# Patient Record
Sex: MALE | Race: WHITE | Employment: OTHER | URBAN - METROPOLITAN AREA
[De-identification: names, ages, dates, MRNs, and addresses within clinical notes are randomized per-mention and may not be internally consistent; named-entity substitution may affect disease eponyms.]

---

## 2017-01-11 ENCOUNTER — TELEPHONE (OUTPATIENT)
Dept: SURGERY | Age: 67
End: 2017-01-11

## 2017-06-29 ENCOUNTER — HOSPITAL ENCOUNTER (OUTPATIENT)
Dept: GENERAL RADIOLOGY | Age: 67
Discharge: HOME | End: 2017-06-29
Attending: INTERNAL MEDICINE | Admitting: INTERNAL MEDICINE

## 2017-06-29 LAB
ANION GAP SERPL CALCULATED.3IONS-SCNC: 14 MMOL/L (ref 4–16)
APTT: 29.3 SECONDS (ref 21.2–33)
BASOPHILS ABSOLUTE: 0 K/CU MM
BASOPHILS RELATIVE PERCENT: 0.5 % (ref 0–1)
BUN BLDV-MCNC: 8 MG/DL (ref 6–23)
CALCIUM SERPL-MCNC: 9.7 MG/DL (ref 8.3–10.6)
CHLORIDE BLD-SCNC: 101 MMOL/L (ref 99–110)
CO2: 25 MMOL/L (ref 21–32)
CREAT SERPL-MCNC: 0.8 MG/DL (ref 0.9–1.3)
DIFFERENTIAL TYPE: ABNORMAL
EOSINOPHILS ABSOLUTE: 0.2 K/CU MM
EOSINOPHILS RELATIVE PERCENT: 1.9 % (ref 0–3)
GFR AFRICAN AMERICAN: >60 ML/MIN/1.73M2
GFR NON-AFRICAN AMERICAN: >60 ML/MIN/1.73M2
GLUCOSE BLD-MCNC: 72 MG/DL (ref 70–140)
HCT VFR BLD CALC: 39 % (ref 42–52)
HEMOGLOBIN: 13.4 GM/DL (ref 13.5–18)
IMMATURE NEUTROPHIL %: 0.4 % (ref 0–0.43)
INR BLD: 0.95 INDEX
LYMPHOCYTES ABSOLUTE: 2.4 K/CU MM
LYMPHOCYTES RELATIVE PERCENT: 29.8 % (ref 24–44)
MCH RBC QN AUTO: 32.1 PG (ref 27–31)
MCHC RBC AUTO-ENTMCNC: 34.4 % (ref 32–36)
MCV RBC AUTO: 93.5 FL (ref 78–100)
MONOCYTES ABSOLUTE: 0.9 K/CU MM
MONOCYTES RELATIVE PERCENT: 10.8 % (ref 0–4)
NUCLEATED RBC %: 0 %
PDW BLD-RTO: 15.3 % (ref 11.7–14.9)
PLATELET # BLD: 227 K/CU MM (ref 140–440)
PMV BLD AUTO: 10.7 FL (ref 7.5–11.1)
POTASSIUM SERPL-SCNC: 4.1 MMOL/L (ref 3.5–5.1)
PROTHROMBIN TIME: 10.8 SECONDS (ref 9.12–12.5)
RBC # BLD: 4.17 M/CU MM (ref 4.6–6.2)
SEGMENTED NEUTROPHILS ABSOLUTE COUNT: 4.5 K/CU MM
SEGMENTED NEUTROPHILS RELATIVE PERCENT: 56.6 % (ref 36–66)
SODIUM BLD-SCNC: 140 MMOL/L (ref 135–145)
TOTAL IMMATURE NEUTOROPHIL: 0.03 K/CU MM
TOTAL NUCLEATED RBC: 0 K/CU MM
WBC # BLD: 7.9 K/CU MM (ref 4–10.5)

## 2019-10-30 ENCOUNTER — APPOINTMENT (OUTPATIENT)
Dept: CT IMAGING | Age: 69
End: 2019-10-30
Payer: MEDICARE

## 2019-10-30 ENCOUNTER — APPOINTMENT (OUTPATIENT)
Dept: GENERAL RADIOLOGY | Age: 69
End: 2019-10-30
Payer: MEDICARE

## 2019-10-30 ENCOUNTER — HOSPITAL ENCOUNTER (EMERGENCY)
Age: 69
Discharge: LEFT BEFORE DISCHARGE | End: 2019-10-30
Attending: EMERGENCY MEDICINE
Payer: MEDICARE

## 2019-10-30 VITALS
SYSTOLIC BLOOD PRESSURE: 135 MMHG | OXYGEN SATURATION: 92 % | HEART RATE: 93 BPM | HEIGHT: 66 IN | BODY MASS INDEX: 24.91 KG/M2 | RESPIRATION RATE: 15 BRPM | TEMPERATURE: 97.8 F | WEIGHT: 155 LBS | DIASTOLIC BLOOD PRESSURE: 78 MMHG

## 2019-10-30 DIAGNOSIS — R77.8 ELEVATED TROPONIN: ICD-10-CM

## 2019-10-30 DIAGNOSIS — R09.02 HYPOXIA: ICD-10-CM

## 2019-10-30 DIAGNOSIS — R55 SYNCOPE AND COLLAPSE: Primary | ICD-10-CM

## 2019-10-30 DIAGNOSIS — R78.0 ELEVATED BLOOD ALCOHOL LEVEL, BLOOD ALCOHOL LEVEL NOT SPECIFIED: ICD-10-CM

## 2019-10-30 LAB
ALBUMIN SERPL-MCNC: 4.2 GM/DL (ref 3.4–5)
ALCOHOL SCREEN SERUM: 0.25 %WT/VOL
ALP BLD-CCNC: 97 IU/L (ref 40–128)
ALT SERPL-CCNC: 36 U/L (ref 10–40)
ANION GAP SERPL CALCULATED.3IONS-SCNC: 19 MMOL/L (ref 4–16)
AST SERPL-CCNC: 43 IU/L (ref 15–37)
BASOPHILS ABSOLUTE: 0.1 K/CU MM
BASOPHILS RELATIVE PERCENT: 0.7 % (ref 0–1)
BILIRUB SERPL-MCNC: 0.3 MG/DL (ref 0–1)
BUN BLDV-MCNC: 5 MG/DL (ref 6–23)
CALCIUM SERPL-MCNC: 8.6 MG/DL (ref 8.3–10.6)
CHLORIDE BLD-SCNC: 94 MMOL/L (ref 99–110)
CO2: 18 MMOL/L (ref 21–32)
CREAT SERPL-MCNC: 0.6 MG/DL (ref 0.9–1.3)
DIFFERENTIAL TYPE: ABNORMAL
EOSINOPHILS ABSOLUTE: 0.1 K/CU MM
EOSINOPHILS RELATIVE PERCENT: 0.8 % (ref 0–3)
GFR AFRICAN AMERICAN: >60 ML/MIN/1.73M2
GFR NON-AFRICAN AMERICAN: >60 ML/MIN/1.73M2
GLUCOSE BLD-MCNC: 94 MG/DL (ref 70–99)
HCT VFR BLD CALC: 44.7 % (ref 42–52)
HEMOGLOBIN: 15.1 GM/DL (ref 13.5–18)
IMMATURE NEUTROPHIL %: 0.7 % (ref 0–0.43)
LYMPHOCYTES ABSOLUTE: 1.3 K/CU MM
LYMPHOCYTES RELATIVE PERCENT: 14.1 % (ref 24–44)
MCH RBC QN AUTO: 34.4 PG (ref 27–31)
MCHC RBC AUTO-ENTMCNC: 33.8 % (ref 32–36)
MCV RBC AUTO: 101.8 FL (ref 78–100)
MONOCYTES ABSOLUTE: 0.8 K/CU MM
MONOCYTES RELATIVE PERCENT: 8.8 % (ref 0–4)
NUCLEATED RBC %: 0 %
PDW BLD-RTO: 14 % (ref 11.7–14.9)
PLATELET # BLD: 300 K/CU MM (ref 140–440)
PMV BLD AUTO: 10 FL (ref 7.5–11.1)
POTASSIUM SERPL-SCNC: 3.6 MMOL/L (ref 3.5–5.1)
RBC # BLD: 4.39 M/CU MM (ref 4.6–6.2)
SEGMENTED NEUTROPHILS ABSOLUTE COUNT: 6.7 K/CU MM
SEGMENTED NEUTROPHILS RELATIVE PERCENT: 74.9 % (ref 36–66)
SODIUM BLD-SCNC: 131 MMOL/L (ref 135–145)
TOTAL CK: 141 IU/L (ref 38–174)
TOTAL IMMATURE NEUTOROPHIL: 0.06 K/CU MM
TOTAL NUCLEATED RBC: 0 K/CU MM
TOTAL PROTEIN: 7 GM/DL (ref 6.4–8.2)
TROPONIN T: 0.02 NG/ML
WBC # BLD: 8.9 K/CU MM (ref 4–10.5)

## 2019-10-30 PROCEDURE — 82550 ASSAY OF CK (CPK): CPT

## 2019-10-30 PROCEDURE — 80053 COMPREHEN METABOLIC PANEL: CPT

## 2019-10-30 PROCEDURE — 93005 ELECTROCARDIOGRAM TRACING: CPT | Performed by: EMERGENCY MEDICINE

## 2019-10-30 PROCEDURE — 85025 COMPLETE CBC W/AUTO DIFF WBC: CPT

## 2019-10-30 PROCEDURE — 71045 X-RAY EXAM CHEST 1 VIEW: CPT

## 2019-10-30 PROCEDURE — 99284 EMERGENCY DEPT VISIT MOD MDM: CPT

## 2019-10-30 PROCEDURE — 36415 COLL VENOUS BLD VENIPUNCTURE: CPT

## 2019-10-30 PROCEDURE — 72125 CT NECK SPINE W/O DYE: CPT

## 2019-10-30 PROCEDURE — 84484 ASSAY OF TROPONIN QUANT: CPT

## 2019-10-30 PROCEDURE — G0480 DRUG TEST DEF 1-7 CLASSES: HCPCS

## 2019-10-30 PROCEDURE — 70450 CT HEAD/BRAIN W/O DYE: CPT

## 2019-10-30 ASSESSMENT — ENCOUNTER SYMPTOMS
NAUSEA: 0
EYE REDNESS: 0
DIARRHEA: 0
CONSTIPATION: 0
BACK PAIN: 0
SORE THROAT: 0
COUGH: 0
SHORTNESS OF BREATH: 0
RHINORRHEA: 0
VOMITING: 0
ABDOMINAL PAIN: 0

## 2019-10-31 LAB
EKG ATRIAL RATE: 76 BPM
EKG ATRIAL RATE: 86 BPM
EKG DIAGNOSIS: NORMAL
EKG DIAGNOSIS: NORMAL
EKG P AXIS: 36 DEGREES
EKG P AXIS: 51 DEGREES
EKG P-R INTERVAL: 174 MS
EKG P-R INTERVAL: 194 MS
EKG Q-T INTERVAL: 398 MS
EKG Q-T INTERVAL: 408 MS
EKG QRS DURATION: 110 MS
EKG QRS DURATION: 134 MS
EKG QTC CALCULATION (BAZETT): 459 MS
EKG QTC CALCULATION (BAZETT): 476 MS
EKG R AXIS: -49 DEGREES
EKG R AXIS: 2 DEGREES
EKG T AXIS: 37 DEGREES
EKG T AXIS: 41 DEGREES
EKG VENTRICULAR RATE: 76 BPM
EKG VENTRICULAR RATE: 86 BPM

## 2019-10-31 PROCEDURE — 93010 ELECTROCARDIOGRAM REPORT: CPT | Performed by: INTERNAL MEDICINE

## 2022-01-11 ENCOUNTER — HOSPITAL ENCOUNTER (OUTPATIENT)
Age: 72
Discharge: HOME | End: 2022-01-11
Payer: MEDICARE

## 2022-01-11 LAB
ANION GAP SERPL CALCULATED.3IONS-SCNC: 13 MMOL/L (ref 4–16)
APTT: 26.9 SECONDS (ref 25.1–37.1)
BASOPHILS ABSOLUTE: 0.1 K/CU MM
BASOPHILS RELATIVE PERCENT: 0.4 % (ref 0–1)
BUN BLDV-MCNC: 5 MG/DL (ref 6–23)
CALCIUM SERPL-MCNC: 9.9 MG/DL (ref 8.3–10.6)
CHLORIDE BLD-SCNC: 101 MMOL/L (ref 99–110)
CO2: 24 MMOL/L (ref 21–32)
CREAT SERPL-MCNC: 0.6 MG/DL (ref 0.9–1.3)
DIFFERENTIAL TYPE: ABNORMAL
EOSINOPHILS ABSOLUTE: 0 K/CU MM
EOSINOPHILS RELATIVE PERCENT: 0.3 % (ref 0–3)
GFR AFRICAN AMERICAN: >60 ML/MIN/1.73M2
GFR NON-AFRICAN AMERICAN: >60 ML/MIN/1.73M2
GLUCOSE BLD-MCNC: 124 MG/DL (ref 70–99)
HCT VFR BLD CALC: 50.8 % (ref 42–52)
HEMOGLOBIN: 17.2 GM/DL (ref 13.5–18)
IMMATURE NEUTROPHIL %: 0.9 % (ref 0–0.43)
INR BLD: 0.86 INDEX
LYMPHOCYTES ABSOLUTE: 1.3 K/CU MM
LYMPHOCYTES RELATIVE PERCENT: 10.9 % (ref 24–44)
MCH RBC QN AUTO: 34.6 PG (ref 27–31)
MCHC RBC AUTO-ENTMCNC: 33.9 % (ref 32–36)
MCV RBC AUTO: 102.2 FL (ref 78–100)
MONOCYTES ABSOLUTE: 0.7 K/CU MM
MONOCYTES RELATIVE PERCENT: 6.2 % (ref 0–4)
NUCLEATED RBC %: 0 %
PDW BLD-RTO: 13.4 % (ref 11.7–14.9)
PLATELET # BLD: 192 K/CU MM (ref 140–440)
PMV BLD AUTO: 11.5 FL (ref 7.5–11.1)
POTASSIUM SERPL-SCNC: 4.6 MMOL/L (ref 3.5–5.1)
PROTHROMBIN TIME: 11.1 SECONDS (ref 11.7–14.5)
RBC # BLD: 4.97 M/CU MM (ref 4.6–6.2)
SARS-COV-2: NOT DETECTED
SEGMENTED NEUTROPHILS ABSOLUTE COUNT: 9.5 K/CU MM
SEGMENTED NEUTROPHILS RELATIVE PERCENT: 81.3 % (ref 36–66)
SODIUM BLD-SCNC: 138 MMOL/L (ref 135–145)
SOURCE: NORMAL
TOTAL IMMATURE NEUTOROPHIL: 0.1 K/CU MM
TOTAL NUCLEATED RBC: 0 K/CU MM
WBC # BLD: 11.6 K/CU MM (ref 4–10.5)

## 2022-01-11 PROCEDURE — U0005 INFEC AGEN DETEC AMPLI PROBE: HCPCS

## 2022-01-11 PROCEDURE — 85025 COMPLETE CBC W/AUTO DIFF WBC: CPT

## 2022-01-11 PROCEDURE — 36415 COLL VENOUS BLD VENIPUNCTURE: CPT

## 2022-01-11 PROCEDURE — 80048 BASIC METABOLIC PNL TOTAL CA: CPT

## 2022-01-11 PROCEDURE — 85730 THROMBOPLASTIN TIME PARTIAL: CPT

## 2022-01-11 PROCEDURE — 85610 PROTHROMBIN TIME: CPT

## 2022-01-11 PROCEDURE — U0003 INFECTIOUS AGENT DETECTION BY NUCLEIC ACID (DNA OR RNA); SEVERE ACUTE RESPIRATORY SYNDROME CORONAVIRUS 2 (SARS-COV-2) (CORONAVIRUS DISEASE [COVID-19]), AMPLIFIED PROBE TECHNIQUE, MAKING USE OF HIGH THROUGHPUT TECHNOLOGIES AS DESCRIBED BY CMS-2020-01-R: HCPCS

## 2022-01-17 ENCOUNTER — HOSPITAL ENCOUNTER (OUTPATIENT)
Dept: CARDIAC CATH/INVASIVE PROCEDURES | Age: 72
Discharge: HOME | End: 2022-01-17
Attending: INTERNAL MEDICINE | Admitting: INTERNAL MEDICINE
Payer: MEDICARE

## 2022-01-17 VITALS
BODY MASS INDEX: 26.84 KG/M2 | HEIGHT: 66 IN | HEART RATE: 85 BPM | TEMPERATURE: 97.3 F | RESPIRATION RATE: 18 BRPM | SYSTOLIC BLOOD PRESSURE: 120 MMHG | DIASTOLIC BLOOD PRESSURE: 80 MMHG | OXYGEN SATURATION: 94 % | WEIGHT: 167 LBS

## 2022-01-17 LAB — ACTIVATED CLOTTING TIME, LOW RANGE: 395 SEC

## 2022-01-17 PROCEDURE — 6360000002 HC RX W HCPCS

## 2022-01-17 PROCEDURE — C9600 PERC DRUG-EL COR STENT SING: HCPCS

## 2022-01-17 PROCEDURE — 93458 L HRT ARTERY/VENTRICLE ANGIO: CPT

## 2022-01-17 PROCEDURE — 6370000000 HC RX 637 (ALT 250 FOR IP): Performed by: INTERNAL MEDICINE

## 2022-01-17 PROCEDURE — C1769 GUIDE WIRE: HCPCS

## 2022-01-17 PROCEDURE — C1874 STENT, COATED/COV W/DEL SYS: HCPCS

## 2022-01-17 PROCEDURE — C1894 INTRO/SHEATH, NON-LASER: HCPCS

## 2022-01-17 PROCEDURE — 6360000004 HC RX CONTRAST MEDICATION

## 2022-01-17 PROCEDURE — C1887 CATHETER, GUIDING: HCPCS

## 2022-01-17 PROCEDURE — 6370000000 HC RX 637 (ALT 250 FOR IP)

## 2022-01-17 PROCEDURE — 2580000003 HC RX 258: Performed by: INTERNAL MEDICINE

## 2022-01-17 PROCEDURE — 2709999900 HC NON-CHARGEABLE SUPPLY

## 2022-01-17 PROCEDURE — 85347 COAGULATION TIME ACTIVATED: CPT

## 2022-01-17 RX ORDER — SODIUM CHLORIDE 9 MG/ML
INJECTION, SOLUTION INTRAVENOUS CONTINUOUS
Status: DISCONTINUED | OUTPATIENT
Start: 2022-01-17 | End: 2022-01-17 | Stop reason: HOSPADM

## 2022-01-17 RX ORDER — DIAZEPAM 5 MG/1
5 TABLET ORAL ONCE
Status: COMPLETED | OUTPATIENT
Start: 2022-01-17 | End: 2022-01-17

## 2022-01-17 RX ORDER — DIPHENHYDRAMINE HCL 25 MG
25 TABLET ORAL ONCE
Status: COMPLETED | OUTPATIENT
Start: 2022-01-17 | End: 2022-01-17

## 2022-01-17 RX ORDER — SODIUM CHLORIDE 9 MG/ML
25 INJECTION, SOLUTION INTRAVENOUS PRN
Status: DISCONTINUED | OUTPATIENT
Start: 2022-01-17 | End: 2022-01-17 | Stop reason: HOSPADM

## 2022-01-17 RX ORDER — ATROPINE SULFATE 0.4 MG/ML
0.5 AMPUL (ML) INJECTION
Status: DISCONTINUED | OUTPATIENT
Start: 2022-01-17 | End: 2022-01-17 | Stop reason: HOSPADM

## 2022-01-17 RX ORDER — CLOPIDOGREL BISULFATE 75 MG/1
75 TABLET ORAL DAILY
Qty: 90 TABLET | Refills: 1 | Status: ON HOLD | OUTPATIENT
Start: 2022-01-17 | End: 2022-02-23

## 2022-01-17 RX ORDER — SODIUM CHLORIDE 0.9 % (FLUSH) 0.9 %
5-40 SYRINGE (ML) INJECTION PRN
Status: DISCONTINUED | OUTPATIENT
Start: 2022-01-17 | End: 2022-01-17 | Stop reason: HOSPADM

## 2022-01-17 RX ORDER — SODIUM CHLORIDE 0.9 % (FLUSH) 0.9 %
5-40 SYRINGE (ML) INJECTION EVERY 12 HOURS SCHEDULED
Status: DISCONTINUED | OUTPATIENT
Start: 2022-01-17 | End: 2022-01-17 | Stop reason: HOSPADM

## 2022-01-17 RX ORDER — MORPHINE SULFATE 2 MG/ML
1 INJECTION, SOLUTION INTRAMUSCULAR; INTRAVENOUS
Status: DISCONTINUED | OUTPATIENT
Start: 2022-01-17 | End: 2022-01-17 | Stop reason: HOSPADM

## 2022-01-17 RX ORDER — ACETAMINOPHEN 325 MG/1
650 TABLET ORAL EVERY 4 HOURS PRN
Status: DISCONTINUED | OUTPATIENT
Start: 2022-01-17 | End: 2022-01-17 | Stop reason: HOSPADM

## 2022-01-17 RX ORDER — ASPIRIN 81 MG/1
81 TABLET ORAL DAILY
Qty: 90 TABLET | Refills: 1 | Status: ON HOLD | OUTPATIENT
Start: 2022-01-17 | End: 2022-02-23

## 2022-01-17 RX ADMIN — DIAZEPAM 5 MG: 5 TABLET ORAL at 08:45

## 2022-01-17 RX ADMIN — SODIUM CHLORIDE: 9 INJECTION, SOLUTION INTRAVENOUS at 08:45

## 2022-01-17 RX ADMIN — DIPHENHYDRAMINE HCL 25 MG: 25 TABLET ORAL at 08:45

## 2022-01-17 NOTE — PROGRESS NOTES
Received from cath lab. Monitor and alarms on. Call Light in reach. Bed in the lowest position. Procedure site assessment completed per Taco CUENCA and Sagar Curtis RN. No hematoma or bleeding noted.

## 2022-01-17 NOTE — PROGRESS NOTES
This RN called patient's son Magnolia Vincent for ride at discharge. Son verbalized understanding for pickup at [phone] at main entrance.

## 2022-01-17 NOTE — H&P
[address] [place] [address], [address] [place] [address]                              HISTORY AND PHYSICAL    PATIENT NAME: Junior Zhang                   :        1950  MED REC NO:   [id number]                          ROOM:  ACCOUNT NO:   [de-identified]                           ADMIT DATE: 2022  PROVIDER:     Misbah Sotomayor MD    INDICATION:  Abnormal stress test.    HISTORY OF PRESENT ILLNESS:  This is a 74-year-old male patient, patient  of Dr. Khanh Bloom, who underwent a stress test and the stress test was found  to have abnormal and his stress test showed that he had TID present and  EF was 42%. Large inferoapical MI noted, mild to moderate ischemia  noted. The patient was also sinus tachycardia present. Therefore, the  patient is here for heart catheterization. _____ greater than 80% right  SFA stenosis. Right profunda femoral artery has moderate disease also  present. He has history of peripheral vascular disease status post  angioplasty done also. PAST MEDICAL HISTORY:  Peripheral vascular disease, history of  hypertension, hyperlipidemia, depression present. Peripheral vascular  disease status post peripheral intervention done. He has sleep apnea,  COPD, anxiety, spinal stenosis and tremor present. PAST SURGERIES:  Hernia repair, neck surgery done with _____ effusion  and peripheral intervention. SOCIAL HISTORY:  Does not smoke, does not drink. MEDICATIONS:  He is on aspirin, buspirone, Pletal, Coreg, folic acid,  Lexapro, naltrexone, Norvasc, simvastatin, Singulair. PHYSICAL EXAMINATION:  GENERAL:  The patient is awake, alert, and answering questions, not in  acute distress. VITAL SIGNS:  Temperature is afebrile, pulse of 100, blood pressure  125/80. HEENT:  Head is normocephalic and atraumatic. Pupils are equal and  reactive to light. CHEST:  Equal expansion. LUNGS:  Clear to auscultation.   No wheezing or rhonchi. HEART:  Regular rate and rhythm. ABDOMEN:  Soft and nontender. Bowel sounds are present. No  hepatosplenomegaly or guarding appreciated. EXTREMITIES:  No cyanosis or clubbing noted. NEUROLOGIC:  Cranial nerves grossly intact. IMPRESSION:  This is a 68-year-old male patient, patient of Dr. Khanh Bloom,  who had a stress test done which was found to be abnormal.  Found to  have large ischemia present. He is also having peripheral vascular  disease present. His right leg has significant stenosis present. Believe I did his intervention in the right leg in the past.  So the  plan is for heart catheterization today and we will make further  recommendation based on that.         Liyah Deshpande MD    D: 01/17/2022 7:34:37       T: 01/17/2022 7:37:00     [email or web address]  Job#: [id number]     Doc#: [id number]    CC:

## 2022-01-17 NOTE — PROGRESS NOTES
Outpatient Pharmacy Progress Note for Meds-to-Beds    Total number of Prescriptions Filled: 1  The following medications were dispensed to the patient during the discharge process:   Clopidogrel 75mg    Additional Documentation:   Medication(s) were delivered to the patient's room prior to discharge    The prescription for Aspirin was not covered under the patient's insurance; this medication can be purchased as an over-the-counter medication. Thank you for letting us serve your patients.   [facility]    [address], [address] [facility]    Phone: [phone]    Fax: [phone]

## 2022-01-17 NOTE — FLOWSHEET NOTE
01/17/22 1125   Puncture Site Assessment 1   Location Radial - right   Site Assessment No redness, drainage, swelling or hematoma   Dressing Applied Transparent occlusive dressing   TR Band removed at this time. Right radial site free of bleeding/hematoma. Tegaderm applied to site. Arm board remains secured with kerlex as reminder to pt to minimize use of right arm.

## 2022-01-17 NOTE — PROGRESS NOTES
This RN called Dr. Huerta Figures office for Nurse office visit tomorrow 1/18/2022. Scheduled for 0930 tomorrow 1/18/2022. Patient to bring all home medications with him to appointment. Patient made aware and verbalized understanding.

## 2022-01-17 NOTE — FLOWSHEET NOTE
Plavix 75mg PO QD #30x0RF and ASA 81mg PO QD #30x0RF phoned to outpt pharmacy per Dr Yesenia Mijares while pt awaits mail in Rx.  Pharmacist states will bring prescriptions to cath holding prior to pt d/c home

## 2022-01-17 NOTE — PROCEDURES
[address] [place] [address], [address] [place] [address]                            CARDIAC CATHETERIZATION    PATIENT NAME: Zoë Chowdhury                   :        1950  MED REC NO:   [id number]                          ROOM:  ACCOUNT NO:   [de-identified]                           ADMIT DATE: 2022  PROVIDER:     Brook Baker MD    DATE OF PROCEDURE:  2022    INDICATION:  Abnormal stress test.    This is a 51-year-old male patient with a history of abnormal stress  test with peripheral vascular disease. The patient was brought to cath  lab today. Informed consent was obtained from the patient. The patient  was prepped and draped in usual sterile fashion. The patient was  injected with 5 mL of 2% lidocaine in the right radial region. Using a  radial needle, the right radial artery was cannulized, and a 5/6-Prydeinig  sheath was placed in the right radial artery. The entire procedure was  done using a guidewire, sheath was flushed in between the procedures. Next, using a TIG catheter, EDP was measured. EDP was around 10 mmHg  present. On the pullback, there was no gradient present across the  aortic valve. Using a TIG catheter, left coronary angiography was performed. Left  coronary angiogram revealed left main is patent. It trifurcates into  LAD, circ, and ramus branch. LAD has a mid 50% stenosis noted and it gives off the diagonal branch,  and it is diagonal 1 and diagonal 2, and mild disease present. Ramus is a medium-size vessel and has proximal 90% stenosis noted. Circ is a medium-sized vessel, and circ has mild disease present. It  gives off the OM1, OM2, mild disease noted. There is a collateral  circulation filling the right coronary artery. It fills the PD and the  PL branch and the distal right coronary artery. Using a TIG catheter, right coronary angiogram was performed.   Right  coronary angiogram revealed right coronary artery is proximally 100%  occluded. IMPRESSION:  1. Right coronary artery has proximal 90% stenosis noted with a GIUSEPPE-1  flow present with a left to right collateral circulation filling the  distal right coronary artery and the PD and PL branch. Left main is patent. Circ has mild disease noted. OM1 and OM2 mild disease noted. Ramus with 90% stenosis. LAD has a mid 50% stenosis noted. D1, D2 mild disease noted. EDP is  around 15 mmHg present. PLAN:  To proceed with intervention of the ramus branch. The patient already had a 6 Western Misa sheath. The patient is anticoagulated with heparin. ACT was kept greater than  250. The patient received Plavix 600 mg postprocedure and aspirin 325  mg postprocedure. Using a VL3 guide left main was engaged. Using BMW Elite wire lesion  was crossed into LAD and in the ramus branch. Lesion was stented with  drug-eluting stent Xience 2.5 x 33 mm stent. Stent was deployed on high  pressure. Lesion decreased from 90% to 0%. Final angiogram with GIUSEPPE-3  flow noted. No dissection, perforation or distal embolization noted. IMPRESSION:  Successful angioplasty of the ramus branch, lesion  decreased from 90% to 0% with a drug-eluting stent Xience 2.5 x 33 mm  stent. The patient tolerated the procedure well. No complications noted. IMPRESSION:  Successful angioplasty of the ramus branch. The patient has significant disease of RCA. If the patient remains  symptomatic, we will consider doing angioplasty of the right coronary  artery where at present the patient has a collateral circulation filling  the right coronary artery from the left system. The patient will be brought for intervention or angiogram of his  peripheral also, as he has abnormal right SFA JOHN noted. No  complication noted.       Blood loss 20cc  Cardiac rehab consulted    Lina Laws MD    D: 01/17/2022 9:44:28       T: 01/17/2022 9:47:28 [email or web address]  Job#: [id number]     Doc#: [id number]    CC:    (Retain this field even if not dictated or not decipherable)

## 2022-02-15 ENCOUNTER — HOSPITAL ENCOUNTER (OUTPATIENT)
Age: 72
Discharge: HOME | End: 2022-02-15
Payer: MEDICARE

## 2022-02-15 LAB
ANION GAP SERPL CALCULATED.3IONS-SCNC: 16 MMOL/L (ref 4–16)
APTT: 29.2 SECONDS (ref 25.1–37.1)
BASOPHILS ABSOLUTE: 0.1 K/CU MM
BASOPHILS RELATIVE PERCENT: 0.7 % (ref 0–1)
BUN BLDV-MCNC: 5 MG/DL (ref 6–23)
CALCIUM SERPL-MCNC: 9.6 MG/DL (ref 8.3–10.6)
CHLORIDE BLD-SCNC: 102 MMOL/L (ref 99–110)
CO2: 23 MMOL/L (ref 21–32)
CREAT SERPL-MCNC: 0.6 MG/DL (ref 0.9–1.3)
DIFFERENTIAL TYPE: ABNORMAL
EOSINOPHILS ABSOLUTE: 0.1 K/CU MM
EOSINOPHILS RELATIVE PERCENT: 1 % (ref 0–3)
GFR AFRICAN AMERICAN: >60 ML/MIN/1.73M2
GFR NON-AFRICAN AMERICAN: >60 ML/MIN/1.73M2
GLUCOSE BLD-MCNC: 112 MG/DL (ref 70–99)
HCT VFR BLD CALC: 49.1 % (ref 42–52)
HEMOGLOBIN: 17.2 GM/DL (ref 13.5–18)
IMMATURE NEUTROPHIL %: 0.7 % (ref 0–0.43)
INR BLD: 0.88 INDEX
LYMPHOCYTES ABSOLUTE: 1.1 K/CU MM
LYMPHOCYTES RELATIVE PERCENT: 11.9 % (ref 24–44)
MCH RBC QN AUTO: 35 PG (ref 27–31)
MCHC RBC AUTO-ENTMCNC: 35 % (ref 32–36)
MCV RBC AUTO: 100 FL (ref 78–100)
MONOCYTES ABSOLUTE: 0.8 K/CU MM
MONOCYTES RELATIVE PERCENT: 8.7 % (ref 0–4)
NUCLEATED RBC %: 0 %
PDW BLD-RTO: 13.3 % (ref 11.7–14.9)
PLATELET # BLD: 138 K/CU MM (ref 140–440)
PMV BLD AUTO: 11.5 FL (ref 7.5–11.1)
POTASSIUM SERPL-SCNC: 4.7 MMOL/L (ref 3.5–5.1)
PROTHROMBIN TIME: 11.3 SECONDS (ref 11.7–14.5)
RBC # BLD: 4.91 M/CU MM (ref 4.6–6.2)
SARS-COV-2: NOT DETECTED
SEGMENTED NEUTROPHILS ABSOLUTE COUNT: 6.8 K/CU MM
SEGMENTED NEUTROPHILS RELATIVE PERCENT: 77 % (ref 36–66)
SODIUM BLD-SCNC: 141 MMOL/L (ref 135–145)
SOURCE: NORMAL
TOTAL IMMATURE NEUTOROPHIL: 0.06 K/CU MM
TOTAL NUCLEATED RBC: 0 K/CU MM
WBC # BLD: 8.9 K/CU MM (ref 4–10.5)

## 2022-02-15 PROCEDURE — 85025 COMPLETE CBC W/AUTO DIFF WBC: CPT

## 2022-02-15 PROCEDURE — 80048 BASIC METABOLIC PNL TOTAL CA: CPT

## 2022-02-15 PROCEDURE — U0003 INFECTIOUS AGENT DETECTION BY NUCLEIC ACID (DNA OR RNA); SEVERE ACUTE RESPIRATORY SYNDROME CORONAVIRUS 2 (SARS-COV-2) (CORONAVIRUS DISEASE [COVID-19]), AMPLIFIED PROBE TECHNIQUE, MAKING USE OF HIGH THROUGHPUT TECHNOLOGIES AS DESCRIBED BY CMS-2020-01-R: HCPCS

## 2022-02-15 PROCEDURE — U0005 INFEC AGEN DETEC AMPLI PROBE: HCPCS

## 2022-02-15 PROCEDURE — 36415 COLL VENOUS BLD VENIPUNCTURE: CPT

## 2022-02-15 PROCEDURE — 85610 PROTHROMBIN TIME: CPT

## 2022-02-15 PROCEDURE — 85730 THROMBOPLASTIN TIME PARTIAL: CPT

## 2022-02-23 ENCOUNTER — HOSPITAL ENCOUNTER (OUTPATIENT)
Dept: CARDIAC CATH/INVASIVE PROCEDURES | Age: 72
Discharge: HOME | End: 2022-02-23
Attending: INTERNAL MEDICINE | Admitting: INTERNAL MEDICINE
Payer: MEDICARE

## 2022-02-23 VITALS
SYSTOLIC BLOOD PRESSURE: 149 MMHG | HEART RATE: 97 BPM | RESPIRATION RATE: 16 BRPM | BODY MASS INDEX: 26.68 KG/M2 | OXYGEN SATURATION: 94 % | WEIGHT: 166 LBS | DIASTOLIC BLOOD PRESSURE: 85 MMHG | TEMPERATURE: 97 F | HEIGHT: 66 IN

## 2022-02-23 DIAGNOSIS — I73.9 PAD (PERIPHERAL ARTERY DISEASE) (HCC): Primary | ICD-10-CM

## 2022-02-23 LAB
ACTIVATED CLOTTING TIME, LOW RANGE: 171 SEC
ACTIVATED CLOTTING TIME, LOW RANGE: 287 SEC
ACTIVATED CLOTTING TIME, LOW RANGE: 315 SEC

## 2022-02-23 PROCEDURE — 6370000000 HC RX 637 (ALT 250 FOR IP)

## 2022-02-23 PROCEDURE — 6360000002 HC RX W HCPCS

## 2022-02-23 PROCEDURE — 2580000003 HC RX 258: Performed by: INTERNAL MEDICINE

## 2022-02-23 PROCEDURE — C2623 CATH, TRANSLUMIN, DRUG-COAT: HCPCS

## 2022-02-23 PROCEDURE — C1887 CATHETER, GUIDING: HCPCS

## 2022-02-23 PROCEDURE — C1894 INTRO/SHEATH, NON-LASER: HCPCS

## 2022-02-23 PROCEDURE — C1876 STENT, NON-COA/NON-COV W/DEL: HCPCS

## 2022-02-23 PROCEDURE — 37221 HC ILIAC TERRITORY PLASTY STENT: CPT

## 2022-02-23 PROCEDURE — 6370000000 HC RX 637 (ALT 250 FOR IP): Performed by: INTERNAL MEDICINE

## 2022-02-23 PROCEDURE — 85347 COAGULATION TIME ACTIVATED: CPT

## 2022-02-23 PROCEDURE — 6360000004 HC RX CONTRAST MEDICATION

## 2022-02-23 PROCEDURE — 2709999900 HC NON-CHARGEABLE SUPPLY

## 2022-02-23 PROCEDURE — C1769 GUIDE WIRE: HCPCS

## 2022-02-23 PROCEDURE — 37224 HC FEM POP TERRITORY PLASTY: CPT

## 2022-02-23 PROCEDURE — C1725 CATH, TRANSLUMIN NON-LASER: HCPCS

## 2022-02-23 PROCEDURE — 2500000003 HC RX 250 WO HCPCS

## 2022-02-23 RX ORDER — SODIUM CHLORIDE 0.9 % (FLUSH) 0.9 %
5-40 SYRINGE (ML) INJECTION EVERY 12 HOURS SCHEDULED
Status: DISCONTINUED | OUTPATIENT
Start: 2022-02-23 | End: 2022-02-23 | Stop reason: HOSPADM

## 2022-02-23 RX ORDER — SODIUM CHLORIDE 9 MG/ML
25 INJECTION, SOLUTION INTRAVENOUS PRN
Status: DISCONTINUED | OUTPATIENT
Start: 2022-02-23 | End: 2022-02-23 | Stop reason: HOSPADM

## 2022-02-23 RX ORDER — DIAZEPAM 5 MG/1
5 TABLET ORAL ONCE
Status: COMPLETED | OUTPATIENT
Start: 2022-02-23 | End: 2022-02-23

## 2022-02-23 RX ORDER — MORPHINE SULFATE 2 MG/ML
1 INJECTION, SOLUTION INTRAMUSCULAR; INTRAVENOUS
Status: DISCONTINUED | OUTPATIENT
Start: 2022-02-23 | End: 2022-02-23 | Stop reason: HOSPADM

## 2022-02-23 RX ORDER — SODIUM CHLORIDE 9 MG/ML
INJECTION, SOLUTION INTRAVENOUS CONTINUOUS
Status: DISCONTINUED | OUTPATIENT
Start: 2022-02-23 | End: 2022-02-23 | Stop reason: ALTCHOICE

## 2022-02-23 RX ORDER — SODIUM CHLORIDE 9 MG/ML
INJECTION, SOLUTION INTRAVENOUS CONTINUOUS
Status: DISCONTINUED | OUTPATIENT
Start: 2022-02-23 | End: 2022-02-23 | Stop reason: HOSPADM

## 2022-02-23 RX ORDER — SODIUM CHLORIDE 0.9 % (FLUSH) 0.9 %
5-40 SYRINGE (ML) INJECTION PRN
Status: DISCONTINUED | OUTPATIENT
Start: 2022-02-23 | End: 2022-02-23 | Stop reason: HOSPADM

## 2022-02-23 RX ORDER — ACETAMINOPHEN 325 MG/1
650 TABLET ORAL EVERY 4 HOURS PRN
Status: DISCONTINUED | OUTPATIENT
Start: 2022-02-23 | End: 2022-02-23 | Stop reason: HOSPADM

## 2022-02-23 RX ORDER — ATROPINE SULFATE 0.4 MG/ML
0.5 AMPUL (ML) INJECTION
Status: DISCONTINUED | OUTPATIENT
Start: 2022-02-23 | End: 2022-02-23 | Stop reason: HOSPADM

## 2022-02-23 RX ORDER — DIPHENHYDRAMINE HCL 25 MG
25 TABLET ORAL ONCE
Status: COMPLETED | OUTPATIENT
Start: 2022-02-23 | End: 2022-02-23

## 2022-02-23 RX ADMIN — DIAZEPAM 5 MG: 5 TABLET ORAL at 07:58

## 2022-02-23 RX ADMIN — DIPHENHYDRAMINE HYDROCHLORIDE 25 MG: 25 TABLET ORAL at 07:58

## 2022-02-23 RX ADMIN — SODIUM CHLORIDE: 9 INJECTION, SOLUTION INTRAVENOUS at 07:37

## 2022-02-23 NOTE — PROGRESS NOTES
This RN called Dr. Jyothi Rogers office and set up Nurse Visit tomorrow 2/24/2022 at 11am for site check. Patient needs to have CBC/BMP labs drawn prior to office visit. Patient verbalizes understanding.

## 2022-02-23 NOTE — H&P
[address] [place] [address], [address] [facility]                              HISTORY AND PHYSICAL    PATIENT NAME: Letha Rubio                   :        1950  MED REC NO:   [id number]                          ROOM:  ACCOUNT NO:   [de-identified]                           ADMIT DATE: 2022  PROVIDER:     Ron Medina MD    INDICATION:  Peripheral vascular disease. HISTORY OF PRESENT ILLNESS:  This is a 17-year-old male patient with  history of coronary artery disease and peripheral vascular disease  present. The patient was here back in January and underwent heart cath. Left main was patent. LAD had 50% stenosis. Ramus had 90% stenosis  which was stented with a 2.5 x 33 mm stent. Circ had mild disease. OM1, OM2 mild disease noted. RCA with proximal 100% occluded with right  and left to right collaterals noted and he is doing better since the  angioplasty of his ramus branch. I did peripheral angiogram on him back in . Abdominal aorta is  patent. Bilateral common and external, internal common femoral and  profunda femoris were patent. Left SFA had mild disease noted. Right  SFA was mid 100% occluded. A balloon PTA was performed. He has  underwent an JOHN recently, was found to be abnormal.  On the right leg,  it is worse than left leg but he complains of both legs are bothering  him when he walks. The patient's chest pain has improved. PAST MEDICAL HISTORY:  History of coronary artery disease, hypertension,  hyperlipidemia peripheral vascular disease present, sleep apnea, COPD,  anxiety, spinal stenosis and tremors present. PAST SURGICAL HISTORY:  Hernia repair, neck surgery with rods placed and  _____ spinal fusion. ALLERGIES:  LISINOPRIL and TRAMADOL. SOCIAL HISTORY:  History of smoking. Does not smoke, does not drink and  I think he quit.     MEDICATIONS:  He is on is - aspirin, buspirone, Pletal, Coreg, folic  acid, Lexapro, naltrexone, Norvasc, simvastatin, Singulair, Coreg and  statins. PHYSICAL EXAMINATION:  GENERAL:  The patient is awake, alert, and answering questions, not in  acute distress. VITAL SIGNS:  Temperature is afebrile, pulse is 65, blood pressure  135/70. HEENT:  Head is normocephalic and atraumatic. Pupils are equal and  reactive to light. CHEST:  Equal expansion. LUNGS:  Clear to auscultation. No wheezing or rhonchi. HEART:  Regular rate and rhythm. ABDOMEN:  Soft and nontender. Bowel sounds are present. No  hepatosplenomegaly or guarding appreciated. EXTREMITIES:  No cyanosis or clubbing noted. NEUROLOGIC:  Cranial nerves grossly intact. LABORATORY DATA:  Labs are normal.  His BUN is 5, creatinine 0.6. CBC  normal.    IMPRESSION:  A 72-year-old male patient with a history of coronary  artery disease status post angioplasty done. History of peripheral  vascular disease, right SFA was intervened. Now the SFA has significant  elevated and abnormal JOHN noted. PLAN:  The plan is for a peripheral angiogram.    I will make further recommendations based on that.         Cedric Campos MD    D: 02/23/2022 7:58:54       T: 02/23/2022 8:02:44     [email or web address]  Job#: [id number]     Doc#: [id number]    CC:

## 2022-02-23 NOTE — PROGRESS NOTES
left femoral 6 fr. sheath removed. Manual pressure held per Tracy RN x 20 minutes. No bruising, drainage, or swelling noted. No hematoma. DSD/Tegederm applied to site. Pt. instructed on post sheath removal care/restrictions and verbalized an understanding.

## 2022-02-23 NOTE — OP NOTE
Operative Note      Patient: Dread Acosta  YOB: 1950  MRN: [id number]    Date of Procedure: 2/23/22    Pre-Op Diagnosis: PAD  Post-Op Diagnosis: Same       Estimated Blood Loss (mL): less than 50     Complications: None      Electronically signed by Brook Baker MD on 2/23/2022 at 10:39 AM    DICTATED -[id number]  ABDOMINAL AORTA DISTAL PATENT    RIGHT  COMMON ILIAC MILD DX  EXTERNAL 70% TO 0% BMS STENT-7X57 MM STENT  INTERNAL PATENT  CFA 80% TO 10% CUTTING BALLOON PTA 5.0 BALLOON  SFA-MID TANDEM 70% TO 10% DRUG COATED BALLOON 5.0  POPLITEAL PATENT  AT AND PT AND PERONEAL PATENT    LEFT  COMMON/EXTERNAL/INTERNAL/CFA/PROFUNDA/SFA AND POPLITEAL MILD DX  AT/PT AND PERONEAL PATENT    NO COMPLICATIONS  LEFT GROIN ACCESS  ASA AND PLAVIX AND HEPARIN  HOME LATER TODAY  F/U TOMORROW IN OFFICE    Electronically signed by Brook Baker MD on 2/23/22 at 10:49 AM EST

## 2022-02-23 NOTE — H&P
Dictated --[id number]  Left leg access and right leg angio    Electronically signed by Therese Kaur MD on 2/23/22 at 7:59 AM EST

## 2022-02-23 NOTE — PROGRESS NOTES
Discharge instructions reviewed with patient. Voices understanding. Ambulated without difficulty. Left groin site free from any active bleeding, oozing, or hematoma noted.

## 2022-02-23 NOTE — PROCEDURES
[address] [place] [address], [address] [place] [address]                            CARDIAC CATHETERIZATION    PATIENT NAME: Zoë Chowdhury                   :        1950  MED REC NO:   [id number]                          ROOM:  ACCOUNT NO:   [de-identified]                           ADMIT DATE: 2022  PROVIDER:     Brook Baker MD    DATE OF PROCEDURE:  2022    INDICATION:  Peripheral vascular disease. HISTORY OF PRESENT ILLNESS:  This is a 53-year-old male patient who was  brought to cath lab today. Informed consent was obtained from the  patient. The patient was prepped and draped in sterile fashion. The  patient was injected with 20 mL of 2% lidocaine in the left femoral  artery region. Using a micropuncture needle, left femoral artery was  cannulized and a 4-Salvadorean sheath was placed in the left femoral artery. The entire procedure was done using a guidewire, sheath was flushed in  between the procedure. A pigtail catheter was placed in the abdominal aorta. A distal  abdominal angiogram was performed. Distal abdominal aorta is patent. Bilateral common iliac arteries appear to be patent. He has some _____  noted. There is some shadowing noted. The right external iliac artery  has moderate disease noted. There is about 40 mm of gradient noted. Right internal artery was patent. Right common femoral artery has a  disease present. About 80% disease noted in the right common femoral  artery. Right profunda was patent. Left common iliac artery is patent. Left external iliac artery is patent. Left internal artery is patent. Left common femoral artery is patent. Selective angiogram of the right leg was performed using catheter. It  shows that the right common iliac artery has 80% stenosis noted. Right  profunda is patent. Right SFA mid has about 70% stenosis noted. This  is a calcified vessel.   Two tandem lesions noted in the right SFA. The  right popliteal artery was patent. The catheter was placed in the  popliteal artery and angiogram was performed. An angiogram shows that  there is a three-vessel runoff noted below the knee; AT, PT, and  peroneal.  There is a slow flow noted. At the end of the foot, has a  two-vessel runoff noted. AT and PT present. There is a slow flow  present. The left leg angiogram was performed. Selective from the sheath itself. It shows that the left common femoral artery is patent. The left  profunda is patent. The left SFA is patent. There is some moderate  disease noted in the left common femoral, but it is patent. Left SFA is  patent. Left profunda is patent. Left popliteal artery is also patent. There was a three-vessel runoff noted below the knee. AT and PT were  definitely filling down below the knee. Peroneal artery is also patent. IMPRESSION:  1. Abdominal aorta is patent. 2.  Bilateral common iliac artery has mild disease noted. 3.  Right external iliac artery has about 40 mm gradient, 70-80%  stenosis noted. The right internal iliac artery is patent. Right  common femoral artery has 80% stenosis noted. Right profunda is patent. Right SFA had mid 70% stenosis noted. Two tandem lesions noted. Right  popliteal artery was patent and below the knee AT, PT were present  filling the foot. 4. Left leg has left common iliac artery is patent. Left internal and external artery are patent. Left common femoral  artery is patent. Left profunda left SFA is patent. Left popliteal  artery is patent. There is a three-vessel runoff noted below the knee  in the left leg. The plan is to proceed with intervention of the right leg. The patient's 4-Belizean sheath was changed for a 6-Belizean sheath. A 6 x  45 cm sheath was placed. Right common femoral artery lesion was  ballooned with a cutting balloon. A 5 mm cutting balloon was used.   The  balloon PTA was performed in the right common femoral artery. The right  SFA was ballooned with a drug-coated balloon, 5 x 150 mm balloon. The  right external iliac artery was stented with a bare-metal stent 7 x 57   cm stent. At the end of the procedure, the patient tolerated the  procedure well and no complications noted. Lesions all were decreased  and the gradient was also improved. IMPRESSION:  1. Successful stent placement of the right external iliac artery lesion  decreased from the 70%  To 0% from 40 mm of gradient down to 0% with a  bare-metal stent 7 x 57 cm stent. 2.  Right common femoral artery cutting balloon PTA was performed,  decreased from 80% down to 10% with a 5 mm balloon PTA  3. Successful PTA of the right SFA tandem lesion of 70% to 10% with a  drug-coated balloon. The patient tolerated the procedure well. No closure devices were placed. The patient will be discharged home and  continue antiplatelets. 4.  The left leg has moderate disease, but I think is stable. Continue  lifestyle changes, needs to cut smoking and antiplatelets. The patient  tolerated the procedure well and will be discharged home.       Blood loss 20cc  Cardiac rehab consulted      Randy Dunn MD    D: 02/23/2022 10:45:55       T: 02/23/2022 10:51:09     [email or web address]  Job#: [id number]     Doc#: [id number]    CC:

## 2024-02-16 ENCOUNTER — HOSPITAL ENCOUNTER (INPATIENT)
Age: 74
LOS: 4 days | Discharge: HOME | DRG: 377 | End: 2024-02-20
Attending: EMERGENCY MEDICINE | Admitting: INTERNAL MEDICINE
Payer: MEDICARE

## 2024-02-16 ENCOUNTER — APPOINTMENT (OUTPATIENT)
Dept: GENERAL RADIOLOGY | Age: 74
DRG: 377 | End: 2024-02-16
Payer: MEDICARE

## 2024-02-16 DIAGNOSIS — D64.9 ANEMIA, UNSPECIFIED TYPE: ICD-10-CM

## 2024-02-16 DIAGNOSIS — R55 SYNCOPE AND COLLAPSE: Primary | ICD-10-CM

## 2024-02-16 DIAGNOSIS — R79.89 ELEVATED BRAIN NATRIURETIC PEPTIDE (BNP) LEVEL: ICD-10-CM

## 2024-02-16 DIAGNOSIS — I21.4 NSTEMI (NON-ST ELEVATED MYOCARDIAL INFARCTION) (HCC): ICD-10-CM

## 2024-02-16 DIAGNOSIS — K92.2 GASTROINTESTINAL HEMORRHAGE, UNSPECIFIED GASTROINTESTINAL HEMORRHAGE TYPE: ICD-10-CM

## 2024-02-16 DIAGNOSIS — I50.9 HEART FAILURE, UNSPECIFIED HF CHRONICITY, UNSPECIFIED HEART FAILURE TYPE (HCC): ICD-10-CM

## 2024-02-16 DIAGNOSIS — I95.9 HYPOTENSION, UNSPECIFIED HYPOTENSION TYPE: ICD-10-CM

## 2024-02-16 DIAGNOSIS — R79.89 ELEVATED TROPONIN: ICD-10-CM

## 2024-02-16 LAB
ALBUMIN SERPL-MCNC: 3.5 GM/DL (ref 3.4–5)
ALP BLD-CCNC: 130 IU/L (ref 40–129)
ALT SERPL-CCNC: 24 U/L (ref 10–40)
ANION GAP SERPL CALCULATED.3IONS-SCNC: 16 MMOL/L (ref 7–16)
AST SERPL-CCNC: 30 IU/L (ref 15–37)
BASOPHILS ABSOLUTE: 0 K/CU MM
BASOPHILS RELATIVE PERCENT: 0.2 % (ref 0–1)
BILIRUB SERPL-MCNC: 0.5 MG/DL (ref 0–1)
BUN SERPL-MCNC: 6 MG/DL (ref 6–23)
CALCIUM SERPL-MCNC: 8.5 MG/DL (ref 8.3–10.6)
CHLORIDE BLD-SCNC: 97 MMOL/L (ref 99–110)
CO2: 19 MMOL/L (ref 21–32)
CREAT SERPL-MCNC: 0.7 MG/DL (ref 0.9–1.3)
DIFFERENTIAL TYPE: ABNORMAL
EKG ATRIAL RATE: 84 BPM
EKG DIAGNOSIS: NORMAL
EKG P AXIS: 39 DEGREES
EKG P-R INTERVAL: 128 MS
EKG Q-T INTERVAL: 412 MS
EKG QRS DURATION: 118 MS
EKG QTC CALCULATION (BAZETT): 486 MS
EKG R AXIS: 94 DEGREES
EKG T AXIS: 238 DEGREES
EKG VENTRICULAR RATE: 84 BPM
EOSINOPHILS ABSOLUTE: 0 K/CU MM
EOSINOPHILS RELATIVE PERCENT: 0.1 % (ref 0–3)
GFR SERPL CREATININE-BSD FRML MDRD: >60 ML/MIN/1.73M2
GLUCOSE SERPL-MCNC: 162 MG/DL (ref 70–99)
HCT VFR BLD CALC: 21.7 % (ref 42–52)
HCT VFR BLD CALC: 25.7 % (ref 42–52)
HEMOGLOBIN: 6.6 GM/DL (ref 13.5–18)
HEMOGLOBIN: 7.9 GM/DL (ref 13.5–18)
IMMATURE NEUTROPHIL %: 0.6 % (ref 0–0.43)
LYMPHOCYTES ABSOLUTE: 1.2 K/CU MM
LYMPHOCYTES RELATIVE PERCENT: 12 % (ref 24–44)
MCH RBC QN AUTO: 35.3 PG (ref 27–31)
MCHC RBC AUTO-ENTMCNC: 30.4 % (ref 32–36)
MCV RBC AUTO: 116 FL (ref 78–100)
MONOCYTES ABSOLUTE: 0.9 K/CU MM
MONOCYTES RELATIVE PERCENT: 9.2 % (ref 0–4)
NUCLEATED RBC %: 0.2 %
PDW BLD-RTO: 17.3 % (ref 11.7–14.9)
PLATELET # BLD: 408 K/CU MM (ref 140–440)
PMV BLD AUTO: 10.4 FL (ref 7.5–11.1)
POTASSIUM SERPL-SCNC: 4.7 MMOL/L (ref 3.5–5.1)
PRO-BNP: 8946 PG/ML
RBC # BLD: 1.87 M/CU MM (ref 4.6–6.2)
SEGMENTED NEUTROPHILS ABSOLUTE COUNT: 7.5 K/CU MM
SEGMENTED NEUTROPHILS RELATIVE PERCENT: 77.9 % (ref 36–66)
SODIUM BLD-SCNC: 132 MMOL/L (ref 135–145)
TOTAL IMMATURE NEUTOROPHIL: 0.06 K/CU MM
TOTAL NUCLEATED RBC: 0 K/CU MM
TOTAL PROTEIN: 6.1 GM/DL (ref 6.4–8.2)
TROPONIN, HIGH SENSITIVITY: 263 NG/L (ref 0–22)
TROPONIN, HIGH SENSITIVITY: 273 NG/L (ref 0–22)
TSH SERPL DL<=0.005 MIU/L-ACNC: 1.9 UIU/ML (ref 0.27–4.2)
WBC # BLD: 9.7 K/CU MM (ref 4–10.5)

## 2024-02-16 PROCEDURE — 2060000000 HC ICU INTERMEDIATE R&B

## 2024-02-16 PROCEDURE — 2580000003 HC RX 258: Performed by: NURSE PRACTITIONER

## 2024-02-16 PROCEDURE — 2580000003 HC RX 258: Performed by: STUDENT IN AN ORGANIZED HEALTH CARE EDUCATION/TRAINING PROGRAM

## 2024-02-16 PROCEDURE — 71045 X-RAY EXAM CHEST 1 VIEW: CPT

## 2024-02-16 PROCEDURE — 93010 ELECTROCARDIOGRAM REPORT: CPT | Performed by: INTERNAL MEDICINE

## 2024-02-16 PROCEDURE — 93005 ELECTROCARDIOGRAM TRACING: CPT | Performed by: EMERGENCY MEDICINE

## 2024-02-16 PROCEDURE — 85025 COMPLETE CBC W/AUTO DIFF WBC: CPT

## 2024-02-16 PROCEDURE — 96360 HYDRATION IV INFUSION INIT: CPT

## 2024-02-16 PROCEDURE — 86901 BLOOD TYPING SEROLOGIC RH(D): CPT

## 2024-02-16 PROCEDURE — 82270 OCCULT BLOOD FECES: CPT

## 2024-02-16 PROCEDURE — 80053 COMPREHEN METABOLIC PANEL: CPT

## 2024-02-16 PROCEDURE — 96361 HYDRATE IV INFUSION ADD-ON: CPT

## 2024-02-16 PROCEDURE — 6370000000 HC RX 637 (ALT 250 FOR IP): Performed by: STUDENT IN AN ORGANIZED HEALTH CARE EDUCATION/TRAINING PROGRAM

## 2024-02-16 PROCEDURE — 85018 HEMOGLOBIN: CPT

## 2024-02-16 PROCEDURE — 85014 HEMATOCRIT: CPT

## 2024-02-16 PROCEDURE — 36430 TRANSFUSION BLD/BLD COMPNT: CPT

## 2024-02-16 PROCEDURE — 36415 COLL VENOUS BLD VENIPUNCTURE: CPT

## 2024-02-16 PROCEDURE — 99285 EMERGENCY DEPT VISIT HI MDM: CPT

## 2024-02-16 PROCEDURE — 86900 BLOOD TYPING SEROLOGIC ABO: CPT

## 2024-02-16 PROCEDURE — 86850 RBC ANTIBODY SCREEN: CPT

## 2024-02-16 PROCEDURE — 6360000002 HC RX W HCPCS: Performed by: STUDENT IN AN ORGANIZED HEALTH CARE EDUCATION/TRAINING PROGRAM

## 2024-02-16 PROCEDURE — 83880 ASSAY OF NATRIURETIC PEPTIDE: CPT

## 2024-02-16 PROCEDURE — A4216 STERILE WATER/SALINE, 10 ML: HCPCS | Performed by: STUDENT IN AN ORGANIZED HEALTH CARE EDUCATION/TRAINING PROGRAM

## 2024-02-16 PROCEDURE — C9113 INJ PANTOPRAZOLE SODIUM, VIA: HCPCS | Performed by: STUDENT IN AN ORGANIZED HEALTH CARE EDUCATION/TRAINING PROGRAM

## 2024-02-16 PROCEDURE — 86922 COMPATIBILITY TEST ANTIGLOB: CPT

## 2024-02-16 PROCEDURE — 84484 ASSAY OF TROPONIN QUANT: CPT

## 2024-02-16 PROCEDURE — P9016 RBC LEUKOCYTES REDUCED: HCPCS

## 2024-02-16 PROCEDURE — 84443 ASSAY THYROID STIM HORMONE: CPT

## 2024-02-16 PROCEDURE — 30233N1 TRANSFUSION OF NONAUTOLOGOUS RED BLOOD CELLS INTO PERIPHERAL VEIN, PERCUTANEOUS APPROACH: ICD-10-PCS | Performed by: STUDENT IN AN ORGANIZED HEALTH CARE EDUCATION/TRAINING PROGRAM

## 2024-02-16 RX ORDER — LORAZEPAM 1 MG/1
1 TABLET ORAL
Status: DISCONTINUED | OUTPATIENT
Start: 2024-02-16 | End: 2024-02-16 | Stop reason: ALTCHOICE

## 2024-02-16 RX ORDER — ONDANSETRON 2 MG/ML
4 INJECTION INTRAMUSCULAR; INTRAVENOUS EVERY 6 HOURS PRN
Status: DISCONTINUED | OUTPATIENT
Start: 2024-02-16 | End: 2024-02-20 | Stop reason: HOSPADM

## 2024-02-16 RX ORDER — SODIUM CHLORIDE 9 MG/ML
INJECTION, SOLUTION INTRAVENOUS PRN
Status: DISCONTINUED | OUTPATIENT
Start: 2024-02-16 | End: 2024-02-20 | Stop reason: HOSPADM

## 2024-02-16 RX ORDER — ACETAMINOPHEN 325 MG/1
650 TABLET ORAL EVERY 6 HOURS PRN
Status: DISCONTINUED | OUTPATIENT
Start: 2024-02-16 | End: 2024-02-20 | Stop reason: HOSPADM

## 2024-02-16 RX ORDER — LORAZEPAM 0.5 MG/1
0.5 TABLET ORAL EVERY 4 HOURS PRN
Status: DISCONTINUED | OUTPATIENT
Start: 2024-02-16 | End: 2024-02-16 | Stop reason: ALTCHOICE

## 2024-02-16 RX ORDER — ASPIRIN 81 MG/1
81 TABLET, CHEWABLE ORAL DAILY
Status: DISCONTINUED | OUTPATIENT
Start: 2024-02-16 | End: 2024-02-20 | Stop reason: HOSPADM

## 2024-02-16 RX ORDER — SODIUM CHLORIDE 0.9 % (FLUSH) 0.9 %
5-40 SYRINGE (ML) INJECTION PRN
Status: DISCONTINUED | OUTPATIENT
Start: 2024-02-16 | End: 2024-02-20 | Stop reason: HOSPADM

## 2024-02-16 RX ORDER — LORAZEPAM 1 MG/1
2 TABLET ORAL
Status: DISCONTINUED | OUTPATIENT
Start: 2024-02-16 | End: 2024-02-16 | Stop reason: ALTCHOICE

## 2024-02-16 RX ORDER — SODIUM CHLORIDE 0.9 % (FLUSH) 0.9 %
5-40 SYRINGE (ML) INJECTION EVERY 12 HOURS SCHEDULED
Status: DISCONTINUED | OUTPATIENT
Start: 2024-02-16 | End: 2024-02-18

## 2024-02-16 RX ORDER — PHENOBARBITAL 32.4 MG/1
32.4 TABLET ORAL 2 TIMES DAILY
Status: COMPLETED | OUTPATIENT
Start: 2024-02-18 | End: 2024-02-18

## 2024-02-16 RX ORDER — LORAZEPAM 1 MG/1
3 TABLET ORAL
Status: DISCONTINUED | OUTPATIENT
Start: 2024-02-16 | End: 2024-02-16 | Stop reason: ALTCHOICE

## 2024-02-16 RX ORDER — ATORVASTATIN CALCIUM 10 MG/1
20 TABLET, FILM COATED ORAL DAILY
Status: DISCONTINUED | OUTPATIENT
Start: 2024-02-17 | End: 2024-02-20 | Stop reason: HOSPADM

## 2024-02-16 RX ORDER — PHENOBARBITAL 32.4 MG/1
32.4 TABLET ORAL DAILY
Status: COMPLETED | OUTPATIENT
Start: 2024-02-19 | End: 2024-02-19

## 2024-02-16 RX ORDER — LANOLIN ALCOHOL/MO/W.PET/CERES
100 CREAM (GRAM) TOPICAL DAILY
Status: DISCONTINUED | OUTPATIENT
Start: 2024-02-16 | End: 2024-02-16 | Stop reason: SDUPTHER

## 2024-02-16 RX ORDER — SODIUM CHLORIDE 0.9 % (FLUSH) 0.9 %
5-40 SYRINGE (ML) INJECTION EVERY 12 HOURS SCHEDULED
Status: DISCONTINUED | OUTPATIENT
Start: 2024-02-16 | End: 2024-02-20 | Stop reason: HOSPADM

## 2024-02-16 RX ORDER — LORAZEPAM 1 MG/1
4 TABLET ORAL
Status: DISCONTINUED | OUTPATIENT
Start: 2024-02-16 | End: 2024-02-16 | Stop reason: ALTCHOICE

## 2024-02-16 RX ORDER — PHENOBARBITAL 32.4 MG/1
64.8 TABLET ORAL 2 TIMES DAILY
Status: COMPLETED | OUTPATIENT
Start: 2024-02-17 | End: 2024-02-17

## 2024-02-16 RX ORDER — 0.9 % SODIUM CHLORIDE 0.9 %
1000 INTRAVENOUS SOLUTION INTRAVENOUS ONCE
Status: COMPLETED | OUTPATIENT
Start: 2024-02-16 | End: 2024-02-16

## 2024-02-16 RX ORDER — M-VIT,TX,IRON,MINS/CALC/FOLIC 27MG-0.4MG
1 TABLET ORAL DAILY
Status: DISCONTINUED | OUTPATIENT
Start: 2024-02-16 | End: 2024-02-20 | Stop reason: HOSPADM

## 2024-02-16 RX ORDER — AMLODIPINE BESYLATE 10 MG/1
10 TABLET ORAL DAILY
Status: DISCONTINUED | OUTPATIENT
Start: 2024-02-16 | End: 2024-02-19

## 2024-02-16 RX ORDER — ACETAMINOPHEN 650 MG/1
650 SUPPOSITORY RECTAL EVERY 6 HOURS PRN
Status: DISCONTINUED | OUTPATIENT
Start: 2024-02-16 | End: 2024-02-20 | Stop reason: HOSPADM

## 2024-02-16 RX ORDER — LANOLIN ALCOHOL/MO/W.PET/CERES
100 CREAM (GRAM) TOPICAL DAILY
Status: DISCONTINUED | OUTPATIENT
Start: 2024-02-16 | End: 2024-02-20 | Stop reason: HOSPADM

## 2024-02-16 RX ORDER — ONDANSETRON 4 MG/1
4 TABLET, ORALLY DISINTEGRATING ORAL EVERY 8 HOURS PRN
Status: DISCONTINUED | OUTPATIENT
Start: 2024-02-16 | End: 2024-02-20 | Stop reason: HOSPADM

## 2024-02-16 RX ADMIN — PHENOBARBITAL SODIUM 319.15 MG: 65 INJECTION INTRAMUSCULAR; INTRAVENOUS at 20:41

## 2024-02-16 RX ADMIN — SODIUM CHLORIDE 1000 ML: 9 INJECTION, SOLUTION INTRAVENOUS at 12:30

## 2024-02-16 RX ADMIN — PANTOPRAZOLE SODIUM 40 MG: 40 INJECTION, POWDER, FOR SOLUTION INTRAVENOUS at 20:52

## 2024-02-16 RX ADMIN — SODIUM CHLORIDE 200 ML: 9 INJECTION, SOLUTION INTRAVENOUS at 20:40

## 2024-02-16 RX ADMIN — Medication 100 MG: at 20:50

## 2024-02-16 RX ADMIN — PHENOBARBITAL SODIUM 319.15 MG: 65 INJECTION INTRAMUSCULAR; INTRAVENOUS at 21:37

## 2024-02-16 RX ADMIN — Medication 1 TABLET: at 20:50

## 2024-02-16 RX ADMIN — SODIUM CHLORIDE, PRESERVATIVE FREE 10 ML: 5 INJECTION INTRAVENOUS at 20:52

## 2024-02-16 ASSESSMENT — PAIN SCALES - GENERAL
PAINLEVEL_OUTOF10: 0
PAINLEVEL_OUTOF10: 0

## 2024-02-16 ASSESSMENT — PAIN - FUNCTIONAL ASSESSMENT: PAIN_FUNCTIONAL_ASSESSMENT: 0-10

## 2024-02-16 NOTE — ED NOTES
1658 perfect serve message sent to Dr Hart on in patient consult from hospitalist.    1656 Dr Hart acknowledged perfect serve message. Added to treatment team.

## 2024-02-16 NOTE — ED PROVIDER NOTES
Patient seen in collaboration with Naomie Cade CNP.  I had a face-to-face examination of this patient.  Briefly this patient was seen by his primary care physician earlier today for bilateral leg swelling for several days and was sent to the ER.  It is reported patient was try to go to the bathroom and then he passed out.  Patient states he just feels tired and sleepy but he cannot fall asleep.  He does not admit to chest or abdominal leg pains or fever or chills or cough.  He is noted to have a blood pressure 75/61 on arrival which improved into the 90s before fluid was considered.  His ox saturation 100%.    Labs: Initial hemoglobin 6.6 with hematocrit of 21.7.  This is severe anemia which is new since last patient is blood work is available to me 2 years ago.  Plan is to transfer the patient for TAVR screening 2 units of packed red blood cells.  Troponin is significantly bumped to 273.    Patient was explained to the workup findings and persistent hypotension which presumably is related to his severe anemia.  It is possible he may be also going into congestive heart failure given his bilateral pitting edema of legs and feet.  Cardiac enzymes are trending up this would be considered NSTEMI and cardiology be consulted.  Patient is high risk for heparinization because of his GI bleeding.      Critical care time of approximately 32 minutes was spent on this patient excluding any separately billable procedure time     Conrad Rivers MD  02/16/24 0347

## 2024-02-16 NOTE — ED NOTES
ED TO INPATIENT SBAR HANDOFF    Patient Name: Andrea Bullock   :  1950  73 y.o.   Preferred Name    Family/Caregiver Present yes   Restraints no   C-SSRS: Risk of Suicide: No Risk  Sitter no   Sepsis Risk Score Sepsis Risk Score: 0.89      Situation  Chief Complaint   Patient presents with    Loss of Consciousness     Brief Description of Patient's Condition: Pt came to the ED after passing out at his doctors office this morning. Pt's pressures have been low since arrival. His hgb was found to be 6.6, and he received his first unit of blood while in the ED. Trops were also elevated.  Mental Status: oriented, alert, coherent, and logical  Arrived from: home    Imaging:   XR CHEST PORTABLE   Final Result   No acute abnormality.           Abnormal labs:   Abnormal Labs Reviewed   CBC WITH AUTO DIFFERENTIAL - Abnormal; Notable for the following components:       Result Value    RBC 1.87 (*)     Hemoglobin 6.6 (*)     Hematocrit 21.7 (*)     .0 (*)     MCH 35.3 (*)     MCHC 30.4 (*)     RDW 17.3 (*)     Segs Relative 77.9 (*)     Lymphocytes % 12.0 (*)     Monocytes % 9.2 (*)     Immature Neutrophil % 0.6 (*)     All other components within normal limits   COMPREHENSIVE METABOLIC PANEL - Abnormal; Notable for the following components:    Sodium 132 (*)     Chloride 97 (*)     CO2 19 (*)     Glucose 162 (*)     Creatinine 0.7 (*)     Total Protein 6.1 (*)     Alkaline Phosphatase 130 (*)     All other components within normal limits   TROPONIN - Abnormal; Notable for the following components:    Troponin, High Sensitivity 273 (*)     All other components within normal limits   BRAIN NATRIURETIC PEPTIDE - Abnormal; Notable for the following components:    Pro-BNP 8,946 (*)     All other components within normal limits   TROPONIN - Abnormal; Notable for the following components:    Troponin, High Sensitivity 263 (*)     All other components within normal limits       Background  History:   Past Medical

## 2024-02-16 NOTE — ED TRIAGE NOTES
Pt arrives with complaint of syncope at Dr. Shaw's office this morning. Pt denies hitting his head. Pt has daily ETOH intake. Case of beer.

## 2024-02-16 NOTE — ED NOTES
1701 paged Silviano SORENSEN taking calls for Dr Samuels  1712 Zunilda SORENSEN covering call for Dr Samuels returned call

## 2024-02-16 NOTE — H&P
Taking? Authorizing Provider   aspirin EC 81 MG EC tablet Take 1 tablet by mouth daily 7/3/17   Arvind Bustamante MD   folic acid (FOLVITE) 1 MG tablet Take 1 mg by mouth daily    Vikram Gresham MD   naltrexone (DEPADE) 50 MG tablet Take 50 mg by mouth daily    Vikram Gresham MD   amLODIPine (NORVASC) 10 MG tablet Take 10 mg by mouth daily    Vikram Gresham MD   simvastatin (ZOCOR) 40 MG tablet Take 40 mg by mouth nightly    ProviderVikram MD       Physical Exam:    Physical Exam     General: NAD  Eyes: EOMI  ENT: neck supple  Cardiovascular: Regular rate.  Respiratory: Clear to auscultation  Gastrointestinal: Soft, non tender  Genitourinary: no suprapubic tenderness  Musculoskeletal: No edema  Skin: warm, dry  Neuro: Alert.  Psych: Mood appropriate.       Past Medical History:   PMHx   Past Medical History:   Diagnosis Date    History of ETOH abuse     previously 8 yrs ago    Hyperlipidemia     Hypertension      PSHX:  has a past surgical history that includes hernia repair (Left); back surgery; and Cervical spine surgery.  Allergies: No Known Allergies  Fam HX: No known family hx  Soc HX:   Social History     Socioeconomic History    Marital status:      Spouse name: None    Number of children: None    Years of education: None    Highest education level: None   Tobacco Use    Smoking status: Every Day     Current packs/day: 1.00     Types: Cigarettes    Smokeless tobacco: Never   Substance and Sexual Activity    Alcohol use: Yes     Alcohol/week: 12.0 standard drinks of alcohol     Types: 12 Standard drinks or equivalent per week    Drug use: No       Medications:   Medications:    sodium chloride flush  5-40 mL IntraVENous 2 times per day    thiamine  100 mg Oral Daily      Infusions:    sodium chloride      sodium chloride       PRN Meds: sodium chloride, , PRN  sodium chloride flush, 5-40 mL, PRN  sodium chloride, , PRN        Labs      CBC:   Recent Labs     02/16/24  1228   WBC

## 2024-02-16 NOTE — CARE COORDINATION
CM received consult for patient in room #15 to assist with substance abuse resources. CM met with patient to begin discharge planning. CM introduced self and CM role. Patient presents to ER with c/o syncopal episode. Patient denies CM needs. States he has everything he needs at home. Patient has a PCP and insurance which assists with medication affordability. Patient typically independent with ADLs. CM discussed addiction support resources. Patient states he does not want any resources at this time and states he only drinks \"1 beer\" daily, although family reports approximately 12 beers daily. CM placed addiction support resources on AVS.

## 2024-02-16 NOTE — ED PROVIDER NOTES
[facility] EMERGENCY DEPARTMENT  EMERGENCY DEPARTMENT ENCOUNTER      Pt Name: Andrea Bullock  MRN: [id number]  Birthdate 1950  Date of evaluation: 2/16/2024  Provider: NILDA Fung - CNP  PCP: Lee Shaw MD  Note Started: 12:14 PM EST 2/16/24    I am the Primary Clinician of Record.   I have seen and evaluated this patient with my supervising physician No att. providers found.  CHIEF COMPLAINT       Chief Complaint   Patient presents with    Loss of Consciousness     HISTORY OF PRESENT ILLNESS: 1 or more Elements   Andrea Bullock is a 73 y.o. male who presents to the ER with chief complaint of syncopal episode while at the doctors office.    He was being elevated for lower extremity edema. Reports he does not remember passing out.  History of vascular disease.  He admits he has been having black tarry stools for the past 1 1/2 weeks.  He denies shortness of breath.  Denies chest pain.  Denies abdominal pain.  No nausea, vomiting.  No hematuria or dysuria.  Pt continue to drink up to 12 alcoholic beverages daily.  He is very adamant that he does not want to be admitted and wants to go home.     I have reviewed the nursing triage documentation and agree unless otherwise noted.    REVIEW OF SYSTEMS :    Review of Systems   Constitutional:  Negative for chills, fatigue and fever.   HENT:  Negative for congestion.    Respiratory:  Positive for shortness of breath. Negative for cough and chest tightness.    Cardiovascular:  Positive for leg swelling. Negative for chest pain.   Gastrointestinal:  Negative for abdominal pain.   Genitourinary:  Negative for difficulty urinating and dysuria.   Musculoskeletal:  Negative for myalgias.   Skin:  Negative for color change and rash.   Neurological:  Positive for syncope. Negative for dizziness, weakness and headaches.   Psychiatric/Behavioral:  Negative for confusion.      Positives and Pertinent

## 2024-02-16 NOTE — ED NOTES
Pt's family member at bedside informed this RN that pt is a daily drinker and normally drinks about 12 beers a day. Pt's last drink was last night.

## 2024-02-16 NOTE — DISCHARGE INSTRUCTIONS
Addiction Support [facility] Treatment Options [facility] REACH  Outpatient treatment for both men & women  [address]   [facility] [address]  [phone]    [address] [facility] [address]  [phone]    [facility]    [address] [facility] [address]  [phone]  Intensive Outpatient and Residential    [facility]        [address] [facility], [address]  [phone]    [facility]   (various other locations in [place]/ visit [email or web address])  [address] [address]  [phone]    [facility]   [address] [facility], ,  [facility], [id number] [phone]    [facility]   [address]  [place] [address]  [phone]      [facility]   [address] [place] [address]  [phone]    [facility]  [address] [place] [address]  [phone]    [facility]:   [address] [place] [address]  [phone]    [facility]:  [address] [place] [address].  [place], [place] [address]  [phone]    [facility]  [address] [address] [place] [address]  [place] [address]  [phone]  [email or web address]    [address], [place] [address] [phone]  Fax [phone]    [address] [facility] [address]  [place], [place] [address]  [phone]  Fax [phone]    [address] [place] [id number] [phone]  Fax [phone]

## 2024-02-17 LAB
ABO/RH: NORMAL
ALBUMIN SERPL-MCNC: 3.3 GM/DL (ref 3.4–5)
ALP BLD-CCNC: 125 IU/L (ref 40–129)
ALT SERPL-CCNC: 24 U/L (ref 10–40)
AMMONIA: 19 UMOL/L (ref 16–60)
ANION GAP SERPL CALCULATED.3IONS-SCNC: 12 MMOL/L (ref 7–16)
ANTIBODY SCREEN: NEGATIVE
APTT: 27.7 SECONDS (ref 25.1–37.1)
AST SERPL-CCNC: 25 IU/L (ref 15–37)
BILIRUB SERPL-MCNC: 0.6 MG/DL (ref 0–1)
BUN SERPL-MCNC: 5 MG/DL (ref 6–23)
CALCIUM SERPL-MCNC: 8.2 MG/DL (ref 8.3–10.6)
CHLORIDE BLD-SCNC: 106 MMOL/L (ref 99–110)
CO2: 22 MMOL/L (ref 21–32)
COMPONENT: NORMAL
CREAT SERPL-MCNC: 0.7 MG/DL (ref 0.9–1.3)
CROSSMATCH RESULT: NORMAL
FERRITIN: 409 NG/ML (ref 30–400)
FOLATE SERPL-MCNC: 9.5 NG/ML (ref 3.1–17.5)
GFR SERPL CREATININE-BSD FRML MDRD: >60 ML/MIN/1.73M2
GLUCOSE SERPL-MCNC: 89 MG/DL (ref 70–99)
HCT VFR BLD CALC: 24.4 % (ref 42–52)
HCT VFR BLD CALC: 25.2 % (ref 42–52)
HCT VFR BLD CALC: 25.9 % (ref 42–52)
HCT VFR BLD CALC: 27.7 % (ref 42–52)
HEMOGLOBIN: 7.6 GM/DL (ref 13.5–18)
HEMOGLOBIN: 7.9 GM/DL (ref 13.5–18)
HEMOGLOBIN: 8 GM/DL (ref 13.5–18)
HEMOGLOBIN: 8.4 GM/DL (ref 13.5–18)
INR BLD: 1.2 INDEX
IRON: 50 UG/DL (ref 59–158)
LACTATE: 1.4 MMOL/L (ref 0.5–1.9)
MAGNESIUM: 2.1 MG/DL (ref 1.8–2.4)
PCT TRANSFERRIN: 22 % (ref 10–44)
PHOSPHORUS: 2.8 MG/DL (ref 2.5–4.9)
POTASSIUM SERPL-SCNC: 3.8 MMOL/L (ref 3.5–5.1)
PROTHROMBIN TIME: 15.4 SECONDS (ref 11.7–14.5)
SODIUM BLD-SCNC: 140 MMOL/L (ref 135–145)
STATUS: NORMAL
TOTAL IRON BINDING CAPACITY: 232 UG/DL (ref 250–450)
TOTAL PROTEIN: 5.1 GM/DL (ref 6.4–8.2)
TRANSFUSION STATUS: NORMAL
TROPONIN, HIGH SENSITIVITY: 297 NG/L (ref 0–22)
TROPONIN, HIGH SENSITIVITY: 305 NG/L (ref 0–22)
TROPONIN, HIGH SENSITIVITY: 334 NG/L (ref 0–22)
UNIT DIVISION: 0
UNIT NUMBER: NORMAL
UNSATURATED IRON BINDING CAPACITY: 182 UG/DL (ref 110–370)
VITAMIN B-12: 489.6 PG/ML (ref 211–911)

## 2024-02-17 PROCEDURE — 83550 IRON BINDING TEST: CPT

## 2024-02-17 PROCEDURE — 84484 ASSAY OF TROPONIN QUANT: CPT

## 2024-02-17 PROCEDURE — 82728 ASSAY OF FERRITIN: CPT

## 2024-02-17 PROCEDURE — 85018 HEMOGLOBIN: CPT

## 2024-02-17 PROCEDURE — 99223 1ST HOSP IP/OBS HIGH 75: CPT | Performed by: INTERNAL MEDICINE

## 2024-02-17 PROCEDURE — 6370000000 HC RX 637 (ALT 250 FOR IP): Performed by: STUDENT IN AN ORGANIZED HEALTH CARE EDUCATION/TRAINING PROGRAM

## 2024-02-17 PROCEDURE — 84100 ASSAY OF PHOSPHORUS: CPT

## 2024-02-17 PROCEDURE — 85610 PROTHROMBIN TIME: CPT

## 2024-02-17 PROCEDURE — 80053 COMPREHEN METABOLIC PANEL: CPT

## 2024-02-17 PROCEDURE — 83605 ASSAY OF LACTIC ACID: CPT

## 2024-02-17 PROCEDURE — 82746 ASSAY OF FOLIC ACID SERUM: CPT

## 2024-02-17 PROCEDURE — 85014 HEMATOCRIT: CPT

## 2024-02-17 PROCEDURE — C9113 INJ PANTOPRAZOLE SODIUM, VIA: HCPCS | Performed by: STUDENT IN AN ORGANIZED HEALTH CARE EDUCATION/TRAINING PROGRAM

## 2024-02-17 PROCEDURE — 82140 ASSAY OF AMMONIA: CPT

## 2024-02-17 PROCEDURE — 85730 THROMBOPLASTIN TIME PARTIAL: CPT

## 2024-02-17 PROCEDURE — 2580000003 HC RX 258: Performed by: STUDENT IN AN ORGANIZED HEALTH CARE EDUCATION/TRAINING PROGRAM

## 2024-02-17 PROCEDURE — 6360000002 HC RX W HCPCS: Performed by: STUDENT IN AN ORGANIZED HEALTH CARE EDUCATION/TRAINING PROGRAM

## 2024-02-17 PROCEDURE — A4216 STERILE WATER/SALINE, 10 ML: HCPCS | Performed by: STUDENT IN AN ORGANIZED HEALTH CARE EDUCATION/TRAINING PROGRAM

## 2024-02-17 PROCEDURE — 83540 ASSAY OF IRON: CPT

## 2024-02-17 PROCEDURE — 36415 COLL VENOUS BLD VENIPUNCTURE: CPT

## 2024-02-17 PROCEDURE — 83735 ASSAY OF MAGNESIUM: CPT

## 2024-02-17 PROCEDURE — 2060000000 HC ICU INTERMEDIATE R&B

## 2024-02-17 PROCEDURE — 82607 VITAMIN B-12: CPT

## 2024-02-17 RX ADMIN — ACETAMINOPHEN 650 MG: 325 TABLET ORAL at 20:38

## 2024-02-17 RX ADMIN — Medication 1 TABLET: at 09:26

## 2024-02-17 RX ADMIN — PHENOBARBITAL 64.8 MG: 32.4 TABLET ORAL at 09:26

## 2024-02-17 RX ADMIN — SODIUM CHLORIDE, PRESERVATIVE FREE 10 ML: 5 INJECTION INTRAVENOUS at 09:26

## 2024-02-17 RX ADMIN — Medication 100 MG: at 09:26

## 2024-02-17 RX ADMIN — PHENOBARBITAL 64.8 MG: 32.4 TABLET ORAL at 20:38

## 2024-02-17 RX ADMIN — SODIUM CHLORIDE, PRESERVATIVE FREE 10 ML: 5 INJECTION INTRAVENOUS at 20:39

## 2024-02-17 RX ADMIN — PANTOPRAZOLE SODIUM 40 MG: 40 INJECTION, POWDER, FOR SOLUTION INTRAVENOUS at 17:47

## 2024-02-17 RX ADMIN — PANTOPRAZOLE SODIUM 40 MG: 40 INJECTION, POWDER, FOR SOLUTION INTRAVENOUS at 05:23

## 2024-02-17 RX ADMIN — ATORVASTATIN CALCIUM 20 MG: 10 TABLET, FILM COATED ORAL at 09:26

## 2024-02-17 ASSESSMENT — PAIN SCALES - GENERAL: PAINLEVEL_OUTOF10: 0

## 2024-02-17 NOTE — CONSULTS
Consult dictated: [id number]    Elevated troponin, trending down   Consider demand ischemia with extremely low hgb  No cp   BNP 8946, does not appear hypervolemic   Acute anemia - hgb  6.6 on arrival. Repeat 7.9  Received 1 unit PRBC in ED   Hypotensive, currently 85/63    Check echo  Hold AC due to ongoing GI bleed  Currently hypotensive - hold amlodipine       ATTENDING  Admitted with GI bleed and Hgb 6.6. Experienced syncope.  We are consulted for HST 300s - has been stable.  Denied CP or SOB when I saw him.  Sys BP 90s. HR 86.  ECG does not show injury pattern.  Has been transfused, with Hgb 8.0.  Needs endoscopy.    Discussed with Dr. Hart and advised him to proceed.    Electronically signed by Inocente Shaikh MD on 2/17/2024 at 10:02 PM   
and  naltrexone.    REVIEW OF SYSTEMS:  A 10-point review of systems is complete and  negative unless noted in the HPI.    PHYSICAL EXAMINATION:  GENERAL:  The patient is alert, in no acute distress.  HEENT:  Head is normocephalic and atraumatic.  Pupils are equal, round,  and reactive.  CHEST:  Equal expansion.  RESPIRATORY:  Clear to auscultation bilaterally.  Respirations are even  and unlabored.  CARDIOVASCULAR:  Regular rate and rhythm.  S1 and S2 auscultated.  GASTROINTESTINAL:  Abdomen is soft and nontender.  EXTREMITIES:  No cyanosis or edema noted.  NEUROLOGIC:  Alert and oriented.  Cranial nerves appear grossly intact.    LABORATORY DATA:  Troponin was initially elevated at 273, trending up  this morning at 334.  Repeat troponin is pending.  ProBNP was elevated  at 8946.  On arrival, hemoglobin was 6.6 and recheck was 7.9.    IMAGING STUDIES:  Chest x-ray showed no acute abnormality.  EKG showed  concerning for ST depression in V3 to V4.    IMPRESSION AND PLAN:  This is a 73-year-old male who presented with  syncopal episode at the doctor's office with recent lower extremity  edema complaints.  The patient was hypotensive on arrival as well as  anemic with a hemoglobin of 6.6.  The patient received 1 unit of PRBCs  in the ED.  The patient was found to have troponin and elevated BNP in  the emergency department.  Initial troponin was elevated at 273.  Last  troponin was 334 with recheck pending.  The patient denies any chest  pain and no shortness of breath at this time.  BNP was elevated in the  emergency department at 8946.  The patient does not appear to be  hypervolemic at this time.  Due to acute anemia, consider demand  ischemia.  Echocardiogram is pending.  Await recheck troponin.  Further  recommendations pending the hospital course.        ALYX CALDERÓN    D: 02/17/2024 7:58:17       T: 02/17/2024 11:31:10     [email or web address]  Job#: [id number]     Doc#: [id number]      ATTENDING  Admitted with GI bleed 
OF SYSTEMS: see HPI for positives and pertinent negatives. All other systems reviewed and are negative    PHYSICAL EXAM:    Vitals:  BP (!) 87/65   Pulse 79   Temp 97.7 °F (36.5 °C) (Oral)   Resp 19   Ht 1.676 m (5' 6\")   Wt 67.4 kg (148 lb 9.4 oz)   SpO2 93%   BMI 23.98 kg/m²   CONSTITUTIONAL: alert, cooperative, no apparent distress,   EYES:  pupils equal, round and reactive to light and sclera clear  ENT:  normocepalic, without obvious abnormality  NECK:  supple, symmetrical, trachea midline  HEMATOLOGIC/LYMPHATICS:  no cervical lymphadenopathy and no supraclavicular lymphadenopathy  LUNGS:  clear to auscultation  CARDIOVASCULAR:  regular rate and rhythm and no murmur noted  ABDOMEN:  Soft, non-tender with normal bowel sounds. No organomegaly or masses  NEUROLOGIC: no focal deficit detected  SKIN:  no lesions  EXTREMITIES: no clubbing, cyanosis, or edema    IMPRESSION:  1) melena and dark stools: Concern for upper GI bleed with history of EtOH use.  -Hemoglobin at presentation 7  - +Leslie NSAID use.     2) acute anemia concern for blood loss.  Last hemoglobin in February 2022 was noted to be 17.2 currently on presentation 6 point 6 repeat hemoglobin 8  -Iron studies with serum iron of 50, percent saturation 22 likely mixed component    3) elevated troponin  -Concern for possible underlying ischemia discussed with cardiology recommending demand ischemia.  -Cardiology on board will defer to them.    4) EtOH abuse  -Currently no sign of withdrawal, drinks 1-2 beers daily.    RECOMMENDATIONS:  1) continue to trend hemoglobin and transfuse for hemoglobin less than 7  2) IV PPI 40 mg twice daily  3) clear liquid diet n.p.o. after midnight, possible EGD tomorrow.    4) Case was tentatively boarded for today however due to his cardiac comorbidities and concern for elevated troponin and EKG changes after discussion with anesthesia it was deferred.  I have personally discussed with Dr. Mercado from cardiology who

## 2024-02-17 NOTE — FLOWSHEET NOTE
[facility] RN rounded on pt for DI score of 53.  PT sitting up in chair, has no C/O pain.  VSS.  No needs at this time.

## 2024-02-17 NOTE — CARE COORDINATION
02/17/24 1254   Service Assessment   Patient Orientation Alert and Oriented;Person;Place;Situation   Cognition Alert   History Provided By Patient;Medical Record   Primary Caregiver Self   Accompanied By/Relationship not applicable   Support Systems Children   Patient's Healthcare Decision Maker is: Legal Next of Kin   PCP Verified by CM Yes   Last Visit to PCP   (unknown)   Prior Functional Level Independent in ADLs/IADLs   Current Functional Level Independent in ADLs/IADLs   Can patient return to prior living arrangement Yes   Ability to make needs known: Good   Family able to assist with home care needs: Yes   Would you like for me to discuss the discharge plan with any other family members/significant others, and if so, who? No   Financial Resources Medicare   Community Resources None   CM/SW Referral Other (see comment)  (dc planning assessment)     CM notes consult for possibility of rehab. Pt has PCP/Ins. Pt does not drive but states his kids help him with his transportation needs. Pt is from home and lives alone. Pt does not own any DME. Pt states that he has the financial ability to cover his Rx costs. Pt states he drinks 1-2 beers a day and he doesn't see a concern at this time. CM is available for any further dc needs.

## 2024-02-17 NOTE — ED PROVIDER NOTES
Sinus rhythm 84.  Right axis with good R wave progression.  Nonspecific T wave changes T wave flattening throughout.  No ST elevation.  This is changed when compared to prior tracing.     Hilda Mason, DO  02/16/24 1900

## 2024-02-18 ENCOUNTER — ANESTHESIA EVENT (OUTPATIENT)
Dept: ENDOSCOPY | Age: 74
End: 2024-02-18
Payer: MEDICARE

## 2024-02-18 ENCOUNTER — ANESTHESIA (OUTPATIENT)
Dept: ENDOSCOPY | Age: 74
End: 2024-02-18
Payer: MEDICARE

## 2024-02-18 LAB
ALBUMIN SERPL-MCNC: 2.9 GM/DL (ref 3.4–5)
ALP BLD-CCNC: 115 IU/L (ref 40–128)
ALT SERPL-CCNC: 18 U/L (ref 10–40)
ANION GAP SERPL CALCULATED.3IONS-SCNC: 8 MMOL/L (ref 7–16)
AST SERPL-CCNC: 17 IU/L (ref 15–37)
BASE EXCESS MIXED: 2.4 (ref 0–3)
BILIRUB SERPL-MCNC: 0.4 MG/DL (ref 0–1)
BUN SERPL-MCNC: 8 MG/DL (ref 6–23)
CALCIUM SERPL-MCNC: 7.9 MG/DL (ref 8.3–10.6)
CHLORIDE BLD-SCNC: 103 MMOL/L (ref 99–110)
CO2: 24 MMOL/L (ref 21–32)
CREAT SERPL-MCNC: 0.5 MG/DL (ref 0.9–1.3)
EKG ATRIAL RATE: 103 BPM
EKG DIAGNOSIS: NORMAL
EKG P AXIS: 24 DEGREES
EKG P-R INTERVAL: 152 MS
EKG Q-T INTERVAL: 366 MS
EKG QRS DURATION: 128 MS
EKG QTC CALCULATION (BAZETT): 479 MS
EKG R AXIS: 100 DEGREES
EKG T AXIS: -65 DEGREES
EKG VENTRICULAR RATE: 103 BPM
GFR SERPL CREATININE-BSD FRML MDRD: >60 ML/MIN/1.73M2
GLUCOSE SERPL-MCNC: 89 MG/DL (ref 70–99)
HCO3 VENOUS: 26.4 MMOL/L (ref 22–29)
HCT VFR BLD CALC: 25.3 % (ref 42–52)
HCT VFR BLD CALC: 26.9 % (ref 42–52)
HCT VFR BLD CALC: 27.5 % (ref 42–52)
HCT VFR BLD CALC: 27.8 % (ref 42–52)
HEMOGLOBIN: 7.8 GM/DL (ref 13.5–18)
HEMOGLOBIN: 8.1 GM/DL (ref 13.5–18)
HEMOGLOBIN: 8.3 GM/DL (ref 13.5–18)
HEMOGLOBIN: 8.5 GM/DL (ref 13.5–18)
MAGNESIUM: 2.1 MG/DL (ref 1.8–2.4)
O2 SAT, VEN: 94 % (ref 50–70)
PCO2, VEN: 38 MMHG (ref 41–51)
PH VENOUS: 7.45 (ref 7.32–7.43)
PHOSPHORUS: 2.7 MG/DL (ref 2.5–4.9)
PO2, VEN: 132 MMHG (ref 28–48)
POTASSIUM SERPL-SCNC: 3.6 MMOL/L (ref 3.5–5.1)
SODIUM BLD-SCNC: 135 MMOL/L (ref 135–145)
TOTAL PROTEIN: 4.5 GM/DL (ref 6.4–8.2)

## 2024-02-18 PROCEDURE — 6370000000 HC RX 637 (ALT 250 FOR IP): Performed by: NURSE PRACTITIONER

## 2024-02-18 PROCEDURE — 0DB68ZX EXCISION OF STOMACH, VIA NATURAL OR ARTIFICIAL OPENING ENDOSCOPIC, DIAGNOSTIC: ICD-10-PCS | Performed by: INTERNAL MEDICINE

## 2024-02-18 PROCEDURE — 83735 ASSAY OF MAGNESIUM: CPT

## 2024-02-18 PROCEDURE — 88342 IMHCHEM/IMCYTCHM 1ST ANTB: CPT | Performed by: PATHOLOGY

## 2024-02-18 PROCEDURE — 85018 HEMOGLOBIN: CPT

## 2024-02-18 PROCEDURE — 82805 BLOOD GASES W/O2 SATURATION: CPT

## 2024-02-18 PROCEDURE — 2709999900 HC NON-CHARGEABLE SUPPLY: Performed by: INTERNAL MEDICINE

## 2024-02-18 PROCEDURE — A4216 STERILE WATER/SALINE, 10 ML: HCPCS | Performed by: STUDENT IN AN ORGANIZED HEALTH CARE EDUCATION/TRAINING PROGRAM

## 2024-02-18 PROCEDURE — 6370000000 HC RX 637 (ALT 250 FOR IP): Performed by: STUDENT IN AN ORGANIZED HEALTH CARE EDUCATION/TRAINING PROGRAM

## 2024-02-18 PROCEDURE — 2580000003 HC RX 258: Performed by: STUDENT IN AN ORGANIZED HEALTH CARE EDUCATION/TRAINING PROGRAM

## 2024-02-18 PROCEDURE — 36415 COLL VENOUS BLD VENIPUNCTURE: CPT

## 2024-02-18 PROCEDURE — 6360000002 HC RX W HCPCS: Performed by: NURSE ANESTHETIST, CERTIFIED REGISTERED

## 2024-02-18 PROCEDURE — 84100 ASSAY OF PHOSPHORUS: CPT

## 2024-02-18 PROCEDURE — C9113 INJ PANTOPRAZOLE SODIUM, VIA: HCPCS | Performed by: STUDENT IN AN ORGANIZED HEALTH CARE EDUCATION/TRAINING PROGRAM

## 2024-02-18 PROCEDURE — 94761 N-INVAS EAR/PLS OXIMETRY MLT: CPT

## 2024-02-18 PROCEDURE — 93005 ELECTROCARDIOGRAM TRACING: CPT | Performed by: STUDENT IN AN ORGANIZED HEALTH CARE EDUCATION/TRAINING PROGRAM

## 2024-02-18 PROCEDURE — 2700000000 HC OXYGEN THERAPY PER DAY

## 2024-02-18 PROCEDURE — 80053 COMPREHEN METABOLIC PANEL: CPT

## 2024-02-18 PROCEDURE — 93010 ELECTROCARDIOGRAM REPORT: CPT | Performed by: INTERNAL MEDICINE

## 2024-02-18 PROCEDURE — 3609012400 HC EGD TRANSORAL BIOPSY SINGLE/MULTIPLE: Performed by: INTERNAL MEDICINE

## 2024-02-18 PROCEDURE — 3700000001 HC ADD 15 MINUTES (ANESTHESIA): Performed by: INTERNAL MEDICINE

## 2024-02-18 PROCEDURE — 6370000000 HC RX 637 (ALT 250 FOR IP): Performed by: INTERNAL MEDICINE

## 2024-02-18 PROCEDURE — 43239 EGD BIOPSY SINGLE/MULTIPLE: CPT | Performed by: INTERNAL MEDICINE

## 2024-02-18 PROCEDURE — 2580000003 HC RX 258: Performed by: NURSE ANESTHETIST, CERTIFIED REGISTERED

## 2024-02-18 PROCEDURE — 85014 HEMATOCRIT: CPT

## 2024-02-18 PROCEDURE — 3700000000 HC ANESTHESIA ATTENDED CARE: Performed by: INTERNAL MEDICINE

## 2024-02-18 PROCEDURE — 6360000002 HC RX W HCPCS: Performed by: STUDENT IN AN ORGANIZED HEALTH CARE EDUCATION/TRAINING PROGRAM

## 2024-02-18 PROCEDURE — 2500000003 HC RX 250 WO HCPCS: Performed by: NURSE ANESTHETIST, CERTIFIED REGISTERED

## 2024-02-18 PROCEDURE — 2060000000 HC ICU INTERMEDIATE R&B

## 2024-02-18 PROCEDURE — 88305 TISSUE EXAM BY PATHOLOGIST: CPT | Performed by: PATHOLOGY

## 2024-02-18 RX ORDER — LANOLIN ALCOHOL/MO/W.PET/CERES
3 CREAM (GRAM) TOPICAL NIGHTLY PRN
Status: DISCONTINUED | OUTPATIENT
Start: 2024-02-18 | End: 2024-02-20 | Stop reason: HOSPADM

## 2024-02-18 RX ORDER — PROPOFOL 10 MG/ML
INJECTION, EMULSION INTRAVENOUS PRN
Status: DISCONTINUED | OUTPATIENT
Start: 2024-02-18 | End: 2024-02-18 | Stop reason: SDUPTHER

## 2024-02-18 RX ORDER — SODIUM CHLORIDE, SODIUM LACTATE, POTASSIUM CHLORIDE, CALCIUM CHLORIDE 600; 310; 30; 20 MG/100ML; MG/100ML; MG/100ML; MG/100ML
INJECTION, SOLUTION INTRAVENOUS CONTINUOUS PRN
Status: DISCONTINUED | OUTPATIENT
Start: 2024-02-18 | End: 2024-02-18 | Stop reason: SDUPTHER

## 2024-02-18 RX ORDER — POTASSIUM CHLORIDE 20 MEQ/1
20 TABLET, EXTENDED RELEASE ORAL 2 TIMES DAILY WITH MEALS
Status: DISCONTINUED | OUTPATIENT
Start: 2024-02-18 | End: 2024-02-20 | Stop reason: HOSPADM

## 2024-02-18 RX ORDER — LIDOCAINE HYDROCHLORIDE 20 MG/ML
INJECTION, SOLUTION EPIDURAL; INFILTRATION; INTRACAUDAL; PERINEURAL PRN
Status: DISCONTINUED | OUTPATIENT
Start: 2024-02-18 | End: 2024-02-18 | Stop reason: SDUPTHER

## 2024-02-18 RX ADMIN — PANTOPRAZOLE SODIUM 40 MG: 40 INJECTION, POWDER, FOR SOLUTION INTRAVENOUS at 17:25

## 2024-02-18 RX ADMIN — PHENOBARBITAL 32.4 MG: 32.4 TABLET ORAL at 09:47

## 2024-02-18 RX ADMIN — SODIUM CHLORIDE, PRESERVATIVE FREE 5 ML: 5 INJECTION INTRAVENOUS at 09:49

## 2024-02-18 RX ADMIN — Medication 1 TABLET: at 09:47

## 2024-02-18 RX ADMIN — PHENOBARBITAL 32.4 MG: 32.4 TABLET ORAL at 21:03

## 2024-02-18 RX ADMIN — POLYETHYLENE GLYCOL 3350, SODIUM SULFATE ANHYDROUS, SODIUM BICARBONATE, SODIUM CHLORIDE, POTASSIUM CHLORIDE 4000 ML: 236; 22.74; 6.74; 5.86; 2.97 POWDER, FOR SOLUTION ORAL at 17:29

## 2024-02-18 RX ADMIN — PROPOFOL 70 MG: 10 INJECTION, EMULSION INTRAVENOUS at 08:36

## 2024-02-18 RX ADMIN — SODIUM CHLORIDE, PRESERVATIVE FREE 10 ML: 5 INJECTION INTRAVENOUS at 21:03

## 2024-02-18 RX ADMIN — POTASSIUM CHLORIDE 20 MEQ: 1500 TABLET, EXTENDED RELEASE ORAL at 11:46

## 2024-02-18 RX ADMIN — PANTOPRAZOLE SODIUM 40 MG: 40 INJECTION, POWDER, FOR SOLUTION INTRAVENOUS at 05:45

## 2024-02-18 RX ADMIN — LIDOCAINE HYDROCHLORIDE 110 MG: 20 INJECTION, SOLUTION EPIDURAL; INFILTRATION; INTRACAUDAL; PERINEURAL at 08:36

## 2024-02-18 RX ADMIN — POTASSIUM CHLORIDE 20 MEQ: 1500 TABLET, EXTENDED RELEASE ORAL at 17:26

## 2024-02-18 RX ADMIN — ATORVASTATIN CALCIUM 20 MG: 10 TABLET, FILM COATED ORAL at 09:47

## 2024-02-18 RX ADMIN — Medication 100 MG: at 09:47

## 2024-02-18 RX ADMIN — SODIUM CHLORIDE, POTASSIUM CHLORIDE, SODIUM LACTATE AND CALCIUM CHLORIDE: 600; 310; 30; 20 INJECTION, SOLUTION INTRAVENOUS at 08:35

## 2024-02-18 RX ADMIN — SODIUM CHLORIDE, PRESERVATIVE FREE 10 ML: 5 INJECTION INTRAVENOUS at 09:48

## 2024-02-18 ASSESSMENT — PAIN SCALES - GENERAL
PAINLEVEL_OUTOF10: 0
PAINLEVEL_OUTOF10: 1
PAINLEVEL_OUTOF10: 0
PAINLEVEL_OUTOF10: 0

## 2024-02-18 NOTE — PLAN OF CARE
Problem: Discharge Planning  Goal: Discharge to home or other facility with appropriate resources  2/18/2024 1010 by Zoey Brock RN  Outcome: Progressing  2/18/2024 0522 by Nelida Smith RN  Outcome: Progressing     Problem: Pain  Goal: Verbalizes/displays adequate comfort level or baseline comfort level  2/18/2024 1010 by Zoey Brock RN  Outcome: Progressing  2/18/2024 0522 by Nelida Smith RN  Outcome: Progressing     Problem: Safety - Adult  Goal: Free from fall injury  2/18/2024 1010 by Zoey Brock RN  Outcome: Progressing  2/18/2024 0522 by Nelida Smith RN  Outcome: Progressing

## 2024-02-19 ENCOUNTER — ANESTHESIA (OUTPATIENT)
Dept: ENDOSCOPY | Age: 74
DRG: 377 | End: 2024-02-19
Payer: MEDICARE

## 2024-02-19 ENCOUNTER — APPOINTMENT (OUTPATIENT)
Dept: NON INVASIVE DIAGNOSTICS | Age: 74
DRG: 377 | End: 2024-02-19
Payer: MEDICARE

## 2024-02-19 ENCOUNTER — ANESTHESIA EVENT (OUTPATIENT)
Dept: ENDOSCOPY | Age: 74
DRG: 377 | End: 2024-02-19
Payer: MEDICARE

## 2024-02-19 LAB
ALBUMIN SERPL-MCNC: 2.8 GM/DL (ref 3.4–5)
ALP BLD-CCNC: 113 IU/L (ref 40–128)
ALT SERPL-CCNC: 17 U/L (ref 10–40)
ANION GAP SERPL CALCULATED.3IONS-SCNC: 13 MMOL/L (ref 7–16)
AST SERPL-CCNC: 31 IU/L (ref 15–37)
BILIRUB SERPL-MCNC: 0.4 MG/DL (ref 0–1)
BUN SERPL-MCNC: 5 MG/DL (ref 6–23)
CALCIUM SERPL-MCNC: 7.7 MG/DL (ref 8.3–10.6)
CHLORIDE BLD-SCNC: 100 MMOL/L (ref 99–110)
CO2: 20 MMOL/L (ref 21–32)
CREAT SERPL-MCNC: 0.4 MG/DL (ref 0.9–1.3)
ECHO AO ROOT DIAM: 2.9 CM
ECHO AO ROOT INDEX: 1.65 CM/M2
ECHO AV AREA PEAK VELOCITY: 2.2 CM2
ECHO AV AREA VTI: 2.1 CM2
ECHO AV AREA/BSA PEAK VELOCITY: 1.3 CM2/M2
ECHO AV AREA/BSA VTI: 1.2 CM2/M2
ECHO AV MEAN GRADIENT: 3 MMHG
ECHO AV MEAN VELOCITY: 0.8 M/S
ECHO AV PEAK GRADIENT: 6 MMHG
ECHO AV PEAK VELOCITY: 1.2 M/S
ECHO AV VELOCITY RATIO: 0.75
ECHO AV VTI: 22.1 CM
ECHO BSA: 1.77 M2
ECHO IVC PROX: 2.1 CM
ECHO LA AREA 4C: 18.4 CM2
ECHO LA DIAMETER INDEX: 2.27 CM/M2
ECHO LA DIAMETER: 4 CM
ECHO LA MAJOR AXIS: 5.9 CM
ECHO LA TO AORTIC ROOT RATIO: 1.38
ECHO LA VOL MOD A4C: 48 ML (ref 18–58)
ECHO LA VOLUME INDEX MOD A4C: 27 ML/M2 (ref 16–34)
ECHO LV E' LATERAL VELOCITY: 13 CM/S
ECHO LV E' SEPTAL VELOCITY: 5 CM/S
ECHO LV EDV A4C: 105 ML
ECHO LV EDV INDEX A4C: 60 ML/M2
ECHO LV EJECTION FRACTION A4C: 35 %
ECHO LV ESV A4C: 69 ML
ECHO LV ESV INDEX A4C: 39 ML/M2
ECHO LV FRACTIONAL SHORTENING: 21 % (ref 28–44)
ECHO LV INTERNAL DIMENSION DIASTOLE INDEX: 2.95 CM/M2
ECHO LV INTERNAL DIMENSION DIASTOLIC: 5.2 CM (ref 4.2–5.9)
ECHO LV INTERNAL DIMENSION SYSTOLIC INDEX: 2.33 CM/M2
ECHO LV INTERNAL DIMENSION SYSTOLIC: 4.1 CM
ECHO LV IVSD: 0.9 CM (ref 0.6–1)
ECHO LV MASS 2D: 181.4 G (ref 88–224)
ECHO LV MASS INDEX 2D: 103.1 G/M2 (ref 49–115)
ECHO LV POSTERIOR WALL DIASTOLIC: 1 CM (ref 0.6–1)
ECHO LV RELATIVE WALL THICKNESS RATIO: 0.38
ECHO LVOT AREA: 3.1 CM2
ECHO LVOT AV VTI INDEX: 0.65
ECHO LVOT DIAM: 2 CM
ECHO LVOT MEAN GRADIENT: 1 MMHG
ECHO LVOT PEAK GRADIENT: 3 MMHG
ECHO LVOT PEAK VELOCITY: 0.9 M/S
ECHO LVOT STROKE VOLUME INDEX: 25.7 ML/M2
ECHO LVOT SV: 45.2 ML
ECHO LVOT VTI: 14.4 CM
ECHO MV A VELOCITY: 1.05 M/S
ECHO MV E DECELERATION TIME (DT): 159 MS
ECHO MV E VELOCITY: 1.03 M/S
ECHO MV E/A RATIO: 0.98
ECHO MV E/E' LATERAL: 7.92
ECHO MV E/E' RATIO (AVERAGED): 14.26
ECHO RV FREE WALL PEAK S': 8 CM/S
ECHO RV MID DIMENSION: 2.9 CM
GFR SERPL CREATININE-BSD FRML MDRD: >60 ML/MIN/1.73M2
GLUCOSE SERPL-MCNC: 91 MG/DL (ref 70–99)
HCT VFR BLD CALC: 27.1 % (ref 42–52)
HCT VFR BLD CALC: 28.3 % (ref 42–52)
HCT VFR BLD CALC: 30.2 % (ref 42–52)
HEMOGLOBIN: 8.5 GM/DL (ref 13.5–18)
HEMOGLOBIN: 8.5 GM/DL (ref 13.5–18)
HEMOGLOBIN: 8.6 GM/DL (ref 13.5–18)
MAGNESIUM: 2.1 MG/DL (ref 1.8–2.4)
PHOSPHORUS: 2.5 MG/DL (ref 2.5–4.9)
POTASSIUM SERPL-SCNC: 4.4 MMOL/L (ref 3.5–5.1)
SODIUM BLD-SCNC: 133 MMOL/L (ref 135–145)
TOTAL PROTEIN: 4.7 GM/DL (ref 6.4–8.2)

## 2024-02-19 PROCEDURE — A4216 STERILE WATER/SALINE, 10 ML: HCPCS | Performed by: STUDENT IN AN ORGANIZED HEALTH CARE EDUCATION/TRAINING PROGRAM

## 2024-02-19 PROCEDURE — 6370000000 HC RX 637 (ALT 250 FOR IP): Performed by: STUDENT IN AN ORGANIZED HEALTH CARE EDUCATION/TRAINING PROGRAM

## 2024-02-19 PROCEDURE — 3609010600 HC COLONOSCOPY POLYPECTOMY SNARE/COLD BIOPSY: Performed by: INTERNAL MEDICINE

## 2024-02-19 PROCEDURE — 2060000000 HC ICU INTERMEDIATE R&B

## 2024-02-19 PROCEDURE — C9113 INJ PANTOPRAZOLE SODIUM, VIA: HCPCS | Performed by: STUDENT IN AN ORGANIZED HEALTH CARE EDUCATION/TRAINING PROGRAM

## 2024-02-19 PROCEDURE — 94762 N-INVAS EAR/PLS OXIMTRY CONT: CPT

## 2024-02-19 PROCEDURE — 45380 COLONOSCOPY AND BIOPSY: CPT | Performed by: INTERNAL MEDICINE

## 2024-02-19 PROCEDURE — 80053 COMPREHEN METABOLIC PANEL: CPT

## 2024-02-19 PROCEDURE — 2700000000 HC OXYGEN THERAPY PER DAY

## 2024-02-19 PROCEDURE — 7100000000 HC PACU RECOVERY - FIRST 15 MIN: Performed by: INTERNAL MEDICINE

## 2024-02-19 PROCEDURE — 6370000000 HC RX 637 (ALT 250 FOR IP): Performed by: INTERNAL MEDICINE

## 2024-02-19 PROCEDURE — 93306 TTE W/DOPPLER COMPLETE: CPT

## 2024-02-19 PROCEDURE — 36415 COLL VENOUS BLD VENIPUNCTURE: CPT

## 2024-02-19 PROCEDURE — 6360000002 HC RX W HCPCS: Performed by: STUDENT IN AN ORGANIZED HEALTH CARE EDUCATION/TRAINING PROGRAM

## 2024-02-19 PROCEDURE — 84100 ASSAY OF PHOSPHORUS: CPT

## 2024-02-19 PROCEDURE — 2580000003 HC RX 258: Performed by: STUDENT IN AN ORGANIZED HEALTH CARE EDUCATION/TRAINING PROGRAM

## 2024-02-19 PROCEDURE — 7100000001 HC PACU RECOVERY - ADDTL 15 MIN: Performed by: INTERNAL MEDICINE

## 2024-02-19 PROCEDURE — 3700000001 HC ADD 15 MINUTES (ANESTHESIA): Performed by: INTERNAL MEDICINE

## 2024-02-19 PROCEDURE — 0DBN8ZX EXCISION OF SIGMOID COLON, VIA NATURAL OR ARTIFICIAL OPENING ENDOSCOPIC, DIAGNOSTIC: ICD-10-PCS | Performed by: INTERNAL MEDICINE

## 2024-02-19 PROCEDURE — 94761 N-INVAS EAR/PLS OXIMETRY MLT: CPT

## 2024-02-19 PROCEDURE — 85014 HEMATOCRIT: CPT

## 2024-02-19 PROCEDURE — 88305 TISSUE EXAM BY PATHOLOGIST: CPT | Performed by: PATHOLOGY

## 2024-02-19 PROCEDURE — 6360000002 HC RX W HCPCS: Performed by: NURSE ANESTHETIST, CERTIFIED REGISTERED

## 2024-02-19 PROCEDURE — 6370000000 HC RX 637 (ALT 250 FOR IP): Performed by: NURSE PRACTITIONER

## 2024-02-19 PROCEDURE — 45385 COLONOSCOPY W/LESION REMOVAL: CPT | Performed by: INTERNAL MEDICINE

## 2024-02-19 PROCEDURE — 85018 HEMOGLOBIN: CPT

## 2024-02-19 PROCEDURE — 94618 PULMONARY STRESS TESTING: CPT

## 2024-02-19 PROCEDURE — 3700000000 HC ANESTHESIA ATTENDED CARE: Performed by: INTERNAL MEDICINE

## 2024-02-19 PROCEDURE — 2709999900 HC NON-CHARGEABLE SUPPLY: Performed by: INTERNAL MEDICINE

## 2024-02-19 PROCEDURE — 2500000003 HC RX 250 WO HCPCS: Performed by: NURSE ANESTHETIST, CERTIFIED REGISTERED

## 2024-02-19 PROCEDURE — 0DBM8ZX EXCISION OF DESCENDING COLON, VIA NATURAL OR ARTIFICIAL OPENING ENDOSCOPIC, DIAGNOSTIC: ICD-10-PCS | Performed by: INTERNAL MEDICINE

## 2024-02-19 PROCEDURE — 83735 ASSAY OF MAGNESIUM: CPT

## 2024-02-19 PROCEDURE — 0DBL8ZX EXCISION OF TRANSVERSE COLON, VIA NATURAL OR ARTIFICIAL OPENING ENDOSCOPIC, DIAGNOSTIC: ICD-10-PCS | Performed by: INTERNAL MEDICINE

## 2024-02-19 RX ORDER — SODIUM CHLORIDE 0.9 % (FLUSH) 0.9 %
5-40 SYRINGE (ML) INJECTION PRN
Status: DISCONTINUED | OUTPATIENT
Start: 2024-02-19 | End: 2024-02-19 | Stop reason: HOSPADM

## 2024-02-19 RX ORDER — M-VIT,TX,IRON,MINS/CALC/FOLIC 27MG-0.4MG
1 TABLET ORAL DAILY
Qty: 30 TABLET | Refills: 0 | Status: SHIPPED | OUTPATIENT
Start: 2024-02-19

## 2024-02-19 RX ORDER — MIDODRINE HYDROCHLORIDE 5 MG/1
5 TABLET ORAL
Status: DISCONTINUED | OUTPATIENT
Start: 2024-02-19 | End: 2024-02-19

## 2024-02-19 RX ORDER — MIDODRINE HYDROCHLORIDE 5 MG/1
5 TABLET ORAL
Qty: 90 TABLET | Refills: 3 | Status: SHIPPED | OUTPATIENT
Start: 2024-02-19 | End: 2024-02-20

## 2024-02-19 RX ORDER — PANTOPRAZOLE SODIUM 20 MG/1
40 TABLET, DELAYED RELEASE ORAL DAILY
Qty: 30 TABLET | Refills: 3 | Status: SHIPPED | OUTPATIENT
Start: 2024-02-19

## 2024-02-19 RX ORDER — MIDODRINE HYDROCHLORIDE 5 MG/1
10 TABLET ORAL
Status: DISCONTINUED | OUTPATIENT
Start: 2024-02-20 | End: 2024-02-20 | Stop reason: HOSPADM

## 2024-02-19 RX ORDER — SODIUM CHLORIDE 9 MG/ML
INJECTION, SOLUTION INTRAVENOUS PRN
Status: DISCONTINUED | OUTPATIENT
Start: 2024-02-19 | End: 2024-02-19 | Stop reason: HOSPADM

## 2024-02-19 RX ORDER — LANOLIN ALCOHOL/MO/W.PET/CERES
100 CREAM (GRAM) TOPICAL DAILY
Qty: 30 TABLET | Refills: 3 | Status: SHIPPED | OUTPATIENT
Start: 2024-02-19

## 2024-02-19 RX ORDER — SODIUM CHLORIDE 0.9 % (FLUSH) 0.9 %
5-40 SYRINGE (ML) INJECTION EVERY 12 HOURS SCHEDULED
Status: DISCONTINUED | OUTPATIENT
Start: 2024-02-19 | End: 2024-02-19 | Stop reason: HOSPADM

## 2024-02-19 RX ORDER — SIMETHICONE 40MG/0.6ML
SUSPENSION, DROPS(FINAL DOSAGE FORM)(ML) ORAL PRN
Status: DISCONTINUED | OUTPATIENT
Start: 2024-02-19 | End: 2024-02-19 | Stop reason: ALTCHOICE

## 2024-02-19 RX ORDER — LIDOCAINE HYDROCHLORIDE 20 MG/ML
INJECTION, SOLUTION EPIDURAL; INFILTRATION; INTRACAUDAL; PERINEURAL PRN
Status: DISCONTINUED | OUTPATIENT
Start: 2024-02-19 | End: 2024-02-19 | Stop reason: SDUPTHER

## 2024-02-19 RX ORDER — RANOLAZINE 500 MG/1
500 TABLET, EXTENDED RELEASE ORAL 2 TIMES DAILY
Qty: 60 TABLET | Refills: 3 | Status: SHIPPED | OUTPATIENT
Start: 2024-02-19

## 2024-02-19 RX ORDER — RANOLAZINE 500 MG/1
500 TABLET, EXTENDED RELEASE ORAL 2 TIMES DAILY
Status: DISCONTINUED | OUTPATIENT
Start: 2024-02-19 | End: 2024-02-20 | Stop reason: HOSPADM

## 2024-02-19 RX ORDER — PROPOFOL 10 MG/ML
INJECTION, EMULSION INTRAVENOUS PRN
Status: DISCONTINUED | OUTPATIENT
Start: 2024-02-19 | End: 2024-02-19 | Stop reason: SDUPTHER

## 2024-02-19 RX ADMIN — Medication 100 MG: at 11:28

## 2024-02-19 RX ADMIN — PROPOFOL 50 MG: 10 INJECTION, EMULSION INTRAVENOUS at 09:34

## 2024-02-19 RX ADMIN — RANOLAZINE 500 MG: 500 TABLET, EXTENDED RELEASE ORAL at 11:27

## 2024-02-19 RX ADMIN — METOPROLOL TARTRATE 25 MG: 25 TABLET, FILM COATED ORAL at 20:16

## 2024-02-19 RX ADMIN — PROPOFOL 50 MG: 10 INJECTION, EMULSION INTRAVENOUS at 09:18

## 2024-02-19 RX ADMIN — LIDOCAINE HYDROCHLORIDE 2 ML: 20 INJECTION, SOLUTION EPIDURAL; INFILTRATION; INTRACAUDAL; PERINEURAL at 09:13

## 2024-02-19 RX ADMIN — POTASSIUM CHLORIDE 20 MEQ: 1500 TABLET, EXTENDED RELEASE ORAL at 16:02

## 2024-02-19 RX ADMIN — PHENYLEPHRINE HYDROCHLORIDE 100 MCG: 10 INJECTION INTRAVENOUS at 09:46

## 2024-02-19 RX ADMIN — SODIUM CHLORIDE, PRESERVATIVE FREE 10 ML: 5 INJECTION INTRAVENOUS at 20:17

## 2024-02-19 RX ADMIN — RANOLAZINE 500 MG: 500 TABLET, EXTENDED RELEASE ORAL at 20:16

## 2024-02-19 RX ADMIN — METOPROLOL TARTRATE 25 MG: 25 TABLET, FILM COATED ORAL at 16:00

## 2024-02-19 RX ADMIN — PHENOBARBITAL 32.4 MG: 32.4 TABLET ORAL at 11:27

## 2024-02-19 RX ADMIN — PANTOPRAZOLE SODIUM 40 MG: 40 INJECTION, POWDER, FOR SOLUTION INTRAVENOUS at 06:35

## 2024-02-19 RX ADMIN — PROPOFOL 50 MG: 10 INJECTION, EMULSION INTRAVENOUS at 09:21

## 2024-02-19 RX ADMIN — MIDODRINE HYDROCHLORIDE 5 MG: 5 TABLET ORAL at 11:28

## 2024-02-19 RX ADMIN — LIDOCAINE HYDROCHLORIDE 2 ML: 20 INJECTION, SOLUTION EPIDURAL; INFILTRATION; INTRACAUDAL; PERINEURAL at 09:12

## 2024-02-19 RX ADMIN — MIDODRINE HYDROCHLORIDE 5 MG: 5 TABLET ORAL at 16:02

## 2024-02-19 RX ADMIN — PANTOPRAZOLE SODIUM 40 MG: 40 INJECTION, POWDER, FOR SOLUTION INTRAVENOUS at 17:03

## 2024-02-19 RX ADMIN — PHENYLEPHRINE HYDROCHLORIDE 100 MCG: 10 INJECTION INTRAVENOUS at 09:24

## 2024-02-19 RX ADMIN — PHENYLEPHRINE HYDROCHLORIDE 100 MCG: 10 INJECTION INTRAVENOUS at 09:38

## 2024-02-19 RX ADMIN — Medication 3 MG: at 20:19

## 2024-02-19 RX ADMIN — PROPOFOL 50 MG: 10 INJECTION, EMULSION INTRAVENOUS at 09:29

## 2024-02-19 RX ADMIN — PROPOFOL 50 MG: 10 INJECTION, EMULSION INTRAVENOUS at 09:12

## 2024-02-19 RX ADMIN — Medication 1 TABLET: at 11:27

## 2024-02-19 RX ADMIN — PHENYLEPHRINE HYDROCHLORIDE 100 MCG: 10 INJECTION INTRAVENOUS at 09:30

## 2024-02-19 RX ADMIN — ATORVASTATIN CALCIUM 20 MG: 10 TABLET, FILM COATED ORAL at 11:27

## 2024-02-19 ASSESSMENT — PAIN SCALES - GENERAL: PAINLEVEL_OUTOF10: 0

## 2024-02-19 ASSESSMENT — PAIN - FUNCTIONAL ASSESSMENT: PAIN_FUNCTIONAL_ASSESSMENT: 0-10

## 2024-02-19 ASSESSMENT — LIFESTYLE VARIABLES: SMOKING_STATUS: 1

## 2024-02-19 NOTE — ANESTHESIA POSTPROCEDURE EVALUATION
Department of Anesthesiology  Postprocedure Note    Patient: Andrea Bullock  MRN: [id number]  YOB: 1950  Date of evaluation: 2/19/2024    Procedure Summary       Date: 02/19/24 Room / Location: [place] [address] / [facility]    Anesthesia Start: 0909 Anesthesia Stop: 0951    Procedure: COLONOSCOPY POLYPECTOMY SNARE/COLD BIOPSY Diagnosis:       Gastrointestinal hemorrhage, unspecified gastrointestinal hemorrhage type      Anemia, unspecified type      (Gastrointestinal hemorrhage, unspecified gastrointestinal hemorrhage type [K92.2])      (Anemia, unspecified type [D64.9])    Surgeons: Robby Hart MD Responsible Provider: Maura Landa MD    Anesthesia Type: MAC ASA Status: 4            Anesthesia Type: No value filed.    Elsy Phase I: Elsy Score: 10    Elsy Phase II:      Anesthesia Post Evaluation    Patient location during evaluation: PACU  Level of consciousness: responsive to light touch and responsive to verbal stimuli  Pain score: 0  Airway patency: patent  Nausea & Vomiting: no nausea and no vomiting  Cardiovascular status: blood pressure returned to baseline  Respiratory status: acceptable and nasal cannula  Hydration status: euvolemic  Comments: Couldn't wean o2 to baseline   Pain management: adequate    No notable events documented.

## 2024-02-19 NOTE — ANESTHESIA PRE PROCEDURE
Department of Anesthesiology  Preprocedure Note       Name:  Andrea Bullock   Age:  73 y.o.  :  1950                                          MRN:  [id number]         Date:  2024      Surgeon: Surgeon(s):  Robby Hart MD    Procedure: Procedure(s):  COLONOSCOPY DIAGNOSTIC    Medications prior to admission:   Prior to Admission medications    Medication Sig Start Date End Date Taking? Authorizing Provider   aspirin EC 81 MG EC tablet Take 1 tablet by mouth daily 7/3/17   Arvind Bustamante MD   folic acid (FOLVITE) 1 MG tablet Take 1 mg by mouth daily    Vikram Gresham MD   naltrexone (DEPADE) 50 MG tablet Take 50 mg by mouth daily    Vikram Gresham MD   amLODIPine (NORVASC) 10 MG tablet Take 10 mg by mouth daily    Vikram Gresham MD   simvastatin (ZOCOR) 40 MG tablet Take 40 mg by mouth nightly    ProviderVikram MD       Current medications:    Current Facility-Administered Medications   Medication Dose Route Frequency Provider Last Rate Last Admin   • potassium chloride (KLOR-CON M) extended release tablet 20 mEq  20 mEq Oral BID  Zunilda Howard APRN - CNP   20 mEq at 24 1726   • melatonin tablet 3 mg  3 mg Oral Nightly PRN Jose D Gamez MD       • 0.9 % sodium chloride infusion   IntraVENous PRN Chalino Larios MD       • sodium chloride flush 0.9 % injection 5-40 mL  5-40 mL IntraVENous 2 times per day Chalino Larios MD   10 mL at 24 2103   • sodium chloride flush 0.9 % injection 5-40 mL  5-40 mL IntraVENous PRN Chalino Larios MD       • 0.9 % sodium chloride infusion   IntraVENous PRN Chalino Larios MD       • sodium chloride flush 0.9 % injection 5-40 mL  5-40 mL IntraVENous PRN Chalino Larios MD       • 0.9 % sodium chloride infusion   IntraVENous PRN Chalino Larios MD       • ondansetron (ZOFRAN-ODT) disintegrating tablet 4 mg  4 mg Oral Q8H PRN Chalino Larios MD        Or   • 
ondansetron (ZOFRAN) injection 4 mg  4 mg IntraVENous Q6H PRN Chalino Larios MD       • acetaminophen (TYLENOL) tablet 650 mg  650 mg Oral Q6H PRN Chalino Larios MD   650 mg at 02/17/24 2038    Or   • acetaminophen (TYLENOL) suppository 650 mg  650 mg Rectal Q6H PRN Chalino Larios MD       • pantoprazole (PROTONIX) 40 mg in sodium chloride (PF) 0.9 % 10 mL injection  40 mg IntraVENous Q12H Chalino Larios MD   40 mg at 02/19/24 0635   • sodium chloride flush 0.9 % injection 5-40 mL  5-40 mL IntraVENous PRN Chalino Larios MD   10 mL at 02/18/24 0948   • 0.9 % sodium chloride infusion   IntraVENous PRN Chalino Larios MD 20 mL/hr at 02/16/24 2040 200 mL at 02/16/24 2040   • thiamine tablet 100 mg  100 mg Oral Daily Chalino Larios MD   100 mg at 02/18/24 0947   • therapeutic multivitamin-minerals 1 tablet  1 tablet Oral Daily Chalino Larios MD   1 tablet at 02/18/24 0947   • [Held by provider] amLODIPine (NORVASC) tablet 10 mg  10 mg Oral Daily Chalino Larios MD       • [Held by provider] aspirin chewable tablet 81 mg  81 mg Oral Daily Chalino Larios MD       • atorvastatin (LIPITOR) tablet 20 mg  20 mg Oral Daily Chalino Larios MD   20 mg at 02/18/24 0947   • PHENobarbital tablet 32.4 mg  32.4 mg Oral Daily Chalino Larios MD           Allergies:  No Known Allergies    Problem List:    Patient Active Problem List   Diagnosis Code   • GI bleed K92.2       Past Medical History:        Diagnosis Date   • History of ETOH abuse     previously 8 yrs ago   • Hyperlipidemia    • Hypertension        Past Surgical History:        Procedure Laterality Date   • BACK SURGERY      del toro rods lower back   • CERVICAL SPINE SURGERY      rods and cadaver bone   • HERNIA REPAIR Left     inguinal   • UPPER GASTROINTESTINAL ENDOSCOPY N/A 2/18/2024    EGD BIOPSY performed by Robby Hart MD at [facility] ENDOSCOPY

## 2024-02-19 NOTE — BRIEF OP NOTE
Brief Postoperative Note      Patient: Andrea Bullock  YOB: 1950  MRN: [id number]    Date of Procedure: 2/18/2024    Pre-Op Diagnosis Codes:     * Gastrointestinal hemorrhage, unspecified gastrointestinal hemorrhage type [K92.2]    Post-Op Diagnosis:  NO bleeding, mild gastritis        Procedure(s):  EGD BIOPSY    Surgeon(s):  Robby Hart MD    Assistant:  * No surgical staff found *    Anesthesia: Monitor Anesthesia Care    Estimated Blood Loss (mL): Minimal    Complications: None    Specimens:   ID Type Source Tests Collected by Time Destination   A : GASTRIC BIOPSIES FOR GASTRITIS Tissue Stomach SURGICAL PATHOLOGY Robby Hart MD 2/18/2024 0841        Implants:  * No implants in log *      Drains: * No LDAs found *    Recommendations:   Postop the patient mentions that he had his last colonoscopy greater than 10 years ago with Dr. Benavides at his outpatient center.  I discussed and recommended that with his ongoing melena and anemia he had a colonoscopy.  At this point he mentions that he would not want to proceed with inpatient colonoscopy as he really wants to be discharged and go home.  I have strongly recommended that he follow-up outpatient with either us or with them for outpatient colonoscopy.       Electronically signed by Robby Hart MD on 2/18/2024 at 8:45 AM  
the patient.             Return to normal activities tomorrow.  Written discharge instructions were             provided to the patient.          -  Await pathology results.          -  Telephone GI clinic for pathology results in 1 week.          -  To visualize the small bowel, perform video capsule endoscopy at             appointment to be scheduled.       Electronically signed by Robby Hart MD on 2/19/2024 at 9:44 AM

## 2024-02-20 VITALS
BODY MASS INDEX: 23.7 KG/M2 | RESPIRATION RATE: 24 BRPM | TEMPERATURE: 97.9 F | SYSTOLIC BLOOD PRESSURE: 94 MMHG | DIASTOLIC BLOOD PRESSURE: 57 MMHG | OXYGEN SATURATION: 93 % | HEART RATE: 80 BPM | HEIGHT: 66 IN | WEIGHT: 147.49 LBS

## 2024-02-20 PROBLEM — D64.9 ANEMIA: Status: ACTIVE | Noted: 2024-02-20

## 2024-02-20 PROCEDURE — A4216 STERILE WATER/SALINE, 10 ML: HCPCS | Performed by: STUDENT IN AN ORGANIZED HEALTH CARE EDUCATION/TRAINING PROGRAM

## 2024-02-20 PROCEDURE — C9113 INJ PANTOPRAZOLE SODIUM, VIA: HCPCS | Performed by: STUDENT IN AN ORGANIZED HEALTH CARE EDUCATION/TRAINING PROGRAM

## 2024-02-20 PROCEDURE — 6370000000 HC RX 637 (ALT 250 FOR IP): Performed by: STUDENT IN AN ORGANIZED HEALTH CARE EDUCATION/TRAINING PROGRAM

## 2024-02-20 PROCEDURE — 6370000000 HC RX 637 (ALT 250 FOR IP)

## 2024-02-20 PROCEDURE — 6370000000 HC RX 637 (ALT 250 FOR IP): Performed by: NURSE PRACTITIONER

## 2024-02-20 PROCEDURE — 99232 SBSQ HOSP IP/OBS MODERATE 35: CPT | Performed by: INTERNAL MEDICINE

## 2024-02-20 PROCEDURE — 6360000002 HC RX W HCPCS: Performed by: STUDENT IN AN ORGANIZED HEALTH CARE EDUCATION/TRAINING PROGRAM

## 2024-02-20 PROCEDURE — 2580000003 HC RX 258: Performed by: STUDENT IN AN ORGANIZED HEALTH CARE EDUCATION/TRAINING PROGRAM

## 2024-02-20 PROCEDURE — 94761 N-INVAS EAR/PLS OXIMETRY MLT: CPT

## 2024-02-20 PROCEDURE — APPNB30 APP NON BILLABLE TIME 0-30 MINS: Performed by: LICENSED PRACTICAL NURSE

## 2024-02-20 PROCEDURE — 6370000000 HC RX 637 (ALT 250 FOR IP): Performed by: INTERNAL MEDICINE

## 2024-02-20 RX ORDER — LISINOPRIL 5 MG/1
2.5 TABLET ORAL DAILY
Status: DISCONTINUED | OUTPATIENT
Start: 2024-02-20 | End: 2024-02-20 | Stop reason: HOSPADM

## 2024-02-20 RX ORDER — LISINOPRIL 2.5 MG/1
2.5 TABLET ORAL DAILY
Qty: 30 TABLET | Refills: 3 | Status: SHIPPED | OUTPATIENT
Start: 2024-02-20

## 2024-02-20 RX ORDER — TIOTROPIUM BROMIDE 18 UG/1
18 CAPSULE ORAL; RESPIRATORY (INHALATION) DAILY
Qty: 90 CAPSULE | Refills: 1 | Status: SHIPPED | OUTPATIENT
Start: 2024-02-20

## 2024-02-20 RX ORDER — MIDODRINE HYDROCHLORIDE 5 MG/1
5 TABLET ORAL
Qty: 90 TABLET | Refills: 3 | Status: SHIPPED | OUTPATIENT
Start: 2024-02-20

## 2024-02-20 RX ADMIN — SODIUM CHLORIDE, PRESERVATIVE FREE 10 ML: 5 INJECTION INTRAVENOUS at 05:17

## 2024-02-20 RX ADMIN — POTASSIUM CHLORIDE 20 MEQ: 1500 TABLET, EXTENDED RELEASE ORAL at 08:38

## 2024-02-20 RX ADMIN — PANTOPRAZOLE SODIUM 40 MG: 40 INJECTION, POWDER, FOR SOLUTION INTRAVENOUS at 05:17

## 2024-02-20 RX ADMIN — METOPROLOL TARTRATE 25 MG: 25 TABLET, FILM COATED ORAL at 08:39

## 2024-02-20 RX ADMIN — Medication 1 TABLET: at 08:38

## 2024-02-20 RX ADMIN — ASPIRIN 81 MG: 81 TABLET, CHEWABLE ORAL at 08:38

## 2024-02-20 RX ADMIN — RANOLAZINE 500 MG: 500 TABLET, EXTENDED RELEASE ORAL at 08:38

## 2024-02-20 RX ADMIN — Medication 100 MG: at 08:38

## 2024-02-20 RX ADMIN — MIDODRINE HYDROCHLORIDE 10 MG: 5 TABLET ORAL at 13:19

## 2024-02-20 RX ADMIN — ATORVASTATIN CALCIUM 20 MG: 10 TABLET, FILM COATED ORAL at 08:38

## 2024-02-20 RX ADMIN — SODIUM CHLORIDE, PRESERVATIVE FREE 10 ML: 5 INJECTION INTRAVENOUS at 08:39

## 2024-02-20 RX ADMIN — LISINOPRIL 2.5 MG: 5 TABLET ORAL at 13:19

## 2024-02-20 RX ADMIN — MIDODRINE HYDROCHLORIDE 10 MG: 5 TABLET ORAL at 08:38

## 2024-02-20 ASSESSMENT — PAIN SCALES - GENERAL
PAINLEVEL_OUTOF10: 0
PAINLEVEL_OUTOF10: 0

## 2024-02-20 NOTE — PLAN OF CARE
Problem: Discharge Planning  Goal: Discharge to home or other facility with appropriate resources  Outcome: Progressing     Problem: Pain  Goal: Verbalizes/displays adequate comfort level or baseline comfort level  Outcome: Progressing  Flowsheets (Taken 2/20/2024 9625)  Verbalizes/displays adequate comfort level or baseline comfort level:   Encourage patient to monitor pain and request assistance   Assess pain using appropriate pain scale   Administer analgesics based on type and severity of pain and evaluate response     Problem: Safety - Adult  Goal: Free from fall injury  Outcome: Progressing     Problem: Nutrition Deficit:  Goal: Optimize nutritional status  Outcome: Progressing

## 2024-02-20 NOTE — CARE COORDINATION
Received referral from [facility] pharmacy to assist with medication cost(s) at time of discharge.  Approved a 1x voucher for five medications.  Faxed voucher to [facility] pharmacy.    Added patient to the flagged list. If pt requests additional help a Med Assist application must be completed and required documents provided to determine eligibility for ongoing assistance.

## 2024-02-20 NOTE — PROGRESS NOTES
[facility] Gastroenterology and Hepatology             MD Robby Burt MD Carol Christensen, APRN-CNP       Skylar Verma, APRN-CNP             [address] [place] [address]             [phone] fax [phone]      Gastroenterology Progress note . 2/20/2024  Reason for consult:  Anemia, concern for GI bleed     Physician attestation:  I have personally seen and examined the patient independently. I have reviewed the patient's available data,including medical history and recent test results. Reviewed and discussed note as documented by СЕРГЕЙ.  I agree with the physical exam findings, assessment and plan.     Briefly   Patient noted to be sitting comfortably in bed.  Hemoglobin stable  No overt melena or hematochezia  Anxious to be discharged    Gen: normal mood, alert  ENT: normal TJ  Cardiovascular: RRR, No M/R/G  Respiratory: CTA BL  Gastrointestinal: Soft,NT ND  Genitourinary: no suprapubic tenderness  Musculoskeletal: no scars  Skin:moist  Neuro: alert and oriented x3    My assessment and plan reveals   1) melena and dark stools: Concern for upper GI bleed with history of EtOH use.  -Hemoglobin at presentation 7  - +Leslie NSAID use.   -EGD with me on 2/18/2024 with mild inflammation in the gastric antrum otherwise unremarkable no active bleeding.  - subsequently underwent colonoscopy with me on 2/19/2024 with multiple colon polyps found, no bleeding, hemorrhoids.  -Recommend outpatient follow-up with me  -Okay to resume ASA 81 mg from GI perspective.     2) acute anemia concern for blood loss.  Last hemoglobin in February 2022 was noted to be 17.2 currently on presentation 6 point 6 repeat hemoglobin 8  -Iron studies with serum iron of 50, percent saturation 22 likely mixed component  -Recommend outpatient follow-up and p.o. supplementation     3) elevated troponin  -Concern for possible underlying ischemia discussed with cardiology recommending 
    Daily Progress Note  Subjective:  Awake, alert, wants to go  home   Episodes of tachycardia overnight   Additional episodes nonsustained vtach today  Replace K   BP 98/69    Attending Note:  Feels weak, but would like to go home.  EGD showed mild gastritis, no bleeding.  /82.  Hr 99.  Had episode of wide complex tachycardia, rate 110 - 140.  K+ 3.5. Will replace and monitor closely.  Explained to patient why he should not leave and go for colonoscopy.    Impression and Plan:   Elevated troponin   - No chest pain   - Troponin 273, 263   - BNP elevated at 8946  - Likely demand ischemia in the setting of severe anemia   - Echo pending   - K 3.6 today, replace   - continue statin    Acute anemia   - Hgb 6.6 on arrival   - s/p PRBCs   - improved with transfusion, now 8.5  - Hold AC d/t anemia   - Endoscopy today - notes reviewed     HTN  - currently hypotensive  - hold amlodipine     Most Recent Echo  3/15/23  Impression:  EF 57%, normal LVEDP  Mild to moderate mitral regurgitation      Most Recent Stress test  3/15/2023  Inferior and apical wall myocardial infarction  associate mild aida-infarct ischemia  Amount of jeopardized myocardium is small    PAST MEDICAL HISTORY:  Anxiety, COPD, depression, hypertension,  hyperlipidemia, peripheral vascular disease, and alcohol abuse.     PAST SURGICAL HISTORY:  Hernia repair, back surgery, and cervical spine  surgery.     SOCIAL HISTORY:  Current smoker.  Current alcohol use, approximately 12  standard drinks per week.     ALLERGIES:  No known drug allergies.      Objective:   BP 98/69   Pulse 93   Temp 98.1 °F (36.7 °C) (Oral)   Resp 20   Ht 1.676 m (5' 6\")   Wt 66.9 kg (147 lb 7.8 oz)   SpO2 99%   BMI 23.81 kg/m²     Intake/Output Summary (Last 24 hours) at 2/18/2024 1241  Last data filed at 2/18/2024 1009  Gross per 24 hour   Intake 840 ml   Output 350 ml   Net 490 ml       Medications:   Scheduled Meds:   potassium chloride  20 mEq Oral BID WC    sodium 
    V2.0    [facility] Progress Note      Name:  Andrea Bullock /Age/Sex: 1950  (73 y.o. male)   MRN & CSN:  [id number] & [id number] Encounter Date/Time: 2024 7:12 AM EST   Location:  -A PCP: Lee Shaw MD     Attending:Jose D Gamez MD       Hospital Day: 4    Assessment and Recommendations   Andrea Bullock is a 73 y.o. male  who presents with GI bleed    Acute anemia - Likely secondary to GI bleed  Presented with syncopal episode and c/o black stool x 2 weeks   - Patient was hypotensive on presentation 75/61 - responding to fluids  - His hb was 6.6 on admission. , he is s/p transfusion of 1 unit of blood  - GI was consulted and patient underwent EGD which showed gastritis.  Underwent colonoscopy today which showed hemorrhoids, 5 polyps which were removed.  Repeat colonoscopy in 3 years.  Outpatient follow-up with GI for pathology  - Continue PPI     Acute hypoxemic respiratory failure  Likely from Anemia       - CXR neg  -Home oxygen eval     Elevated troponin  Has h/o CAD  - No chest pain, CXR neg  - Troponin 273 > 263.   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  Last drink a day prior to presentation  -Continue thiamine, multivitamin and folic acid     HTN   -Started on midodrine  -Amlodipine discontinued      Diet Diet NPO Exceptions are: Ice Chips   DVT Prophylaxis [] Lovenox, []  Heparin, [x] SCDs, [] Ambulation,  [] Eliquis, [] Xarelto  [] Coumadin   Code Status Full Code   Disposition From: Home  Expected Disposition: Home  Estimated Date of Discharge: TBD  Patient 
    V2.0    [facility] [place]      Name:  Andrea Bullock /Age/Sex: 1950  (73 y.o. male)   MRN & CSN:  [id number] & [id number] Encounter Date/Time: 2024 7:12 AM EST   Location:  -A PCP: Lee Shaw MD     Attending:Jose D Gamez MD       Hospital Day: 4    Assessment and Recommendations   Andrea Bullock is a 73 y.o. male  who presents with GI bleed      Acute anemia - Likely secondary to GI bleed  Presented with syncopal episode and c/o black stool x 2 weeks   - Patient was hypotensive on presentation 75/61 - responding to fluids  - His hb was 6.6 on admission. , he is s/p transfusion of 1 unit of blood, currently 8. Monitor H/H q6h and transfuse for hgb <7  - GI was consulted and patient underwent EGD which showed gastritis.  Colonoscopy planned tomorrow  - Continue PPI    Acute Hypoxia  Likely from Anemia       - CXR neg  - Remains on supplemental O2, wean as able     Elevated troponin  Has h/o CAD  - No chest pain, CXR neg  - Troponin 273 > 263. BNP - 8946 - Does not appear hypervolemic  - likely deman ischemia  - Cardio consulted, serial trops, no Ap/AC given bleed, will appreciate further recs  - Continue statins                                         Alcohol abuse  No sign of withdrawal, drink up to 1-2 beer daily.  Last drink a day prior to presentation  - Placed on CIWA protocol. Monitor for withdrawal  - Placed on Thiamine and MVT      HTN   - Currently hypotensive. Amlodipine held        Diet Diet NPO Exceptions are: Ice Chips   DVT Prophylaxis [] Lovenox, []  Heparin, [x] SCDs, [] Ambulation,  [] Eliquis, [] Xarelto  [] Coumadin   Code Status Full Code   Disposition From: Home  Expected Disposition: Home  Estimated Date of Discharge: TBD  Patient requires continued admission due to GI bleed   Surrogate Decision Maker/ POA            Personally reviewed Lab Studies and Imaging             Subjective:     Chief Complaint:     Patient seen and examined at bedside 
  Patient was seen in hospital for   CHF.  I am prescribing oxygen because the diagnosis and testing requires the patient to have oxygen in the home.  Conditions will improve or be benefited by oxygen use.     
0829: Pt alert and oriented x 4. Pre-op questions asked and answered appropriately by patient. Name, , armband verified, procedure, allergies, implants, last PO intake, history, meds.  
0955- pt received from Endo. Monitors placed and alarms on. Report received from Tegan CALVO. Pt drowsy but arouses to name being called.  1005- pt resting comfortably. Denies any pain or nausea.  1020- pt resting comfortably. Denies any needs currently.  1030- report called to MUKUND Felix RN prior to transport to inpatient room.  1043- pt transported to inpatient room.    
4 Eyes Skin Assessment     NAME:  Andrea Bullock  YOB: 1950  MEDICAL RECORD NUMBER:  [id number]    The patient is being assessed for  Admission    I agree that at least one RN has performed a thorough Head to Toe Skin Assessment on the patient. ALL assessment sites listed below have been assessed.      Areas assessed by both nurses:    Head, Face, Ears, Shoulders, Back, Chest, Arms, Elbows, Hands, Sacrum. Buttock, Coccyx, Ischium, Legs. Feet and Heels, and Under Medical Devices         Does the Patient have a Wound? No noted wound(s)       Boston Prevention initiated by RN: No  Wound Care Orders initiated by RN: No    Pressure Injury (Stage 3,4, Unstageable, DTI, NWPT, and Complex wounds) if present, place Wound referral order by RN under : No    New Ostomies, if present place, Ostomy referral order under : No     Nurse 1 eSignature: Electronically signed by Cintia Oh RN on 2/16/24 at 6:06 PM EST    **SHARE this note so that the co-signing nurse can place an eSignature**    Nurse 2 eSignature: {Esignature:[id number]}    
4 Eyes Skin Assessment     NAME:  Andrea Bullock  YOB: 1950  MEDICAL RECORD NUMBER:  [id number]    The patient is being assessed for  Transfer to New Unit    I agree that at least one RN has performed a thorough Head to Toe Skin Assessment on the patient. ALL assessment sites listed below have been assessed.      Areas assessed by both nurses:    Head, Face, Ears, Shoulders, Back, Chest, Arms, Elbows, Hands, Sacrum. Buttock, Coccyx, Ischium, Legs. Feet and Heels, and Under Medical Devices         Does the Patient have a Wound? No noted wound(s)       Boston Prevention initiated by RN: No  Wound Care Orders initiated by RN: No    Pressure Injury (Stage 3,4, Unstageable, DTI, NWPT, and Complex wounds) if present, place Wound referral order by RN under : No    New Ostomies, if present place, Ostomy referral order under : No     Nurse 1 eSignature: Electronically signed by Nelida Smith RN on 2/16/24 at 10:51 PM EST    **SHARE this note so that the co-signing nurse can place an eSignature**    Nurse 2 eSignature: Electronically signed by Smitha Serrano RN on 2/17/24 at 2:48 AM EST    
4 Eyes Skin Assessment     NAME:  Andrea Bullock  YOB: 1950  MEDICAL RECORD NUMBER:  [id number]    The patient is being assessed for  Admission    I agree that at least one RN has performed a thorough Head to Toe Skin Assessment on the patient. ALL assessment sites listed below have been assessed.      Areas assessed by both nurses:    Head, Face, Ears, Shoulders, Back, Chest, Arms, Elbows, Hands, Sacrum. Buttock, Coccyx, Ischium, and Legs. Feet and Heels        Does the Patient have a Wound? No noted wound(s)       Boston Prevention initiated by RN: No  Wound Care Orders initiated by RN: No    Pressure Injury (Stage 3,4, Unstageable, DTI, NWPT, and Complex wounds) if present, place Wound referral order by RN under : No    New Ostomies, if present place, Ostomy referral order under : No     Nurse 1 eSignature: Electronically signed by Connie Rosales RN on 2/16/24 at 5:38 PM EST    **SHARE this note so that the co-signing nurse can place an eSignature**    Nurse 2 eSignature: {Esignature:[id number]}    
Comprehensive Nutrition Assessment    Type and Reason for Visit:  Initial, Positive Nutrition Screen (wt loss)    Nutrition Recommendations/Plan:   Advance to a Regular Diet as timely as able  Begin clear liquid/fortified gelatin oral nutrition supplements tid     Malnutrition Assessment:  Malnutrition Status:  Insufficient data (02/18/24 1044)    Context:  Social/Environmental Circumstances       Nutrition Assessment:    Pt was admitted with GIB. H/O hypertension, hyperlipidemia, alcohol abuse, and  diastolic dysfunction. EGD this morning reveals no bleeding, mild gastritis. He is consuming 76% to all of his meals. He has lost 11% over the past 2 yrs. Will add clear liquid and fortified gelatin supplement for him and continue to follow as moderate nutrition risk.    Nutrition Related Findings:     Wound Type: None       Current Nutrition Intake & Therapies:    Average Meal Intake: %  Average Supplements Intake: None Ordered  ADULT DIET; Clear Liquid    Anthropometric Measures:  Height: 167.6 cm (5' 6\")  Ideal Body Weight (IBW): 142 lbs (65 kg)    Admission Body Weight: 67.4 kg (148 lb 9.4 oz)  Current Body Weight: 66.9 kg (147 lb 7.8 oz),   IBW.    Current BMI (kg/m2): 23.8  Usual Body Weight: 75.3 kg (166 lb) (2/23/22)  % Weight Change (Calculated): -11.2                    BMI Categories: Normal Weight (BMI 22.0 to 24.9) age over 65    Estimated Daily Nutrient Needs:  Energy Requirements Based On: Kcal/kg  Weight Used for Energy Requirements: Current  Energy (kcal/day): 2799-1044 (25-30 kcal/kg)  Weight Used for Protein Requirements: Current  Protein (g/day): 67-80 (1-1.2 g/kg)  Method Used for Fluid Requirements: 1 ml/kcal  Fluid (ml/day): 7692-9016    Nutrition Diagnosis:   Predicted inadequate energy intake related to psychological cause or life stress as evidenced by weight loss    Nutrition Interventions:   Food and/or Nutrient Delivery: Modify Current Diet, Start Oral Nutrition Supplement  Nutrition 
Critical Trop back 334 will repeat most likely falsely elevated with extremely low hgb awaiting hgb results to see if more blood products are needed. Cardio cons. Awaitng recs  
Notified E Rhys DERAS latest  troponin  334 , Hgb 7.9  
Outpatient Pharmacy Progress Note for Meds-to-Beds    Total number of Prescriptions Filled: 8  The following medications were dispensed to the patient during the discharge process:  lisinopril  metoprolol tartrate  midodrine  pantoprazole  ranolazine  therapeutic multivitamin-minerals  thiamine  tiotropium     Additional Documentation:  Patient picked-up the medication(s) in the OP Pharmacy  [facility] was able to help cover the cost of the medications to provide patient with a $0.00 co-pay.       Thank you for letting us serve your patients.  [address] [address]    Phone: [phone]    Fax: [phone]        
Overnight pulse oximetry study completed with Pt on O2 at 1lpm nasal cannula.  Results in soft chart.  
PT 02 SAT 85% ON 6 L 02 NC. DECISION MADE TO TAKE PT TO PACU. REPORT GIVEN TO BRIDGETT CUENCA IN PACU. JAIRO CUENCA ON 2N NOTIFIED.  
PT son notified of PT discharge home o2 eval completed no oxygen needed for day time use just at night. PT son will be here within 30 mins to pick PT up to transport home. Med assist used for PT. IV removed zero issues.  
Patient agreed to Colonoscopy in AM,  was made aware.   
Patient noted to have 17 beats of vtach. Dr. Gamez was made aware.   
Pt had run of 9  beats Vtachy pt sleeping BP  99//70 HR 84 O2 sat 96 % 0n  2 L N C pt sleeping E  Joiner NP aware.                                                                                                                                                                                                                                         
Pt home O2 paperwork faxed to [facility].  This testing will be good for 48 hours and will have to be repeated if pt has not discharged prior to then. Please have the pt call [facility] () when discharged for the delivery of oxygen at home.    
Pt ws found with his nasal cannula off at 01:19. Nasal cannula was replaced at 2 LPM and his sats increased to 97% by 01:22. Nursing staff was notified.  PCA stated that she has already replaced the pt's O2 tonight.    
Zunilda Howard NP was notified of pt latest troponin 334 no complaint of chest pain  
 [id number] __    [x] Patient Qualifies      [] Patient Does NOT qualify     
small     PAST MEDICAL HISTORY:  Anxiety, COPD, depression, hypertension,  hyperlipidemia, peripheral vascular disease, and alcohol abuse.     PAST SURGICAL HISTORY:  Hernia repair, back surgery, and cervical spine  surgery.     SOCIAL HISTORY:  Current smoker.  Current alcohol use, approximately 12  standard drinks per week.     ALLERGIES:  No known drug allergies.       Objective:   BP 98/68   Pulse 88   Temp 98.2 °F (36.8 °C) (Temporal)   Resp 20   Ht 1.676 m (5' 6\")   Wt 66.9 kg (147 lb 7.8 oz)   SpO2 95%   BMI 23.81 kg/m²     Intake/Output Summary (Last 24 hours) at 2/19/2024 0944  Last data filed at 2/18/2024 1740  Gross per 24 hour   Intake 1080 ml   Output 1020 ml   Net 60 ml       Medications:   Scheduled Meds:   potassium chloride  20 mEq Oral BID WC    sodium chloride flush  5-40 mL IntraVENous 2 times per day    pantoprazole (PROTONIX) 40 mg in sodium chloride (PF) 0.9 % 10 mL injection  40 mg IntraVENous Q12H    thiamine  100 mg Oral Daily    multivitamin  1 tablet Oral Daily    [Held by provider] amLODIPine  10 mg Oral Daily    [Held by provider] aspirin  81 mg Oral Daily    atorvastatin  20 mg Oral Daily    PHENobarbital  32.4 mg Oral Daily      Infusions:   sodium chloride      sodium chloride      sodium chloride      sodium chloride 150 mL/hr at 02/19/24 0909      PRN Meds:  simethicone, melatonin, sodium chloride, sodium chloride flush, sodium chloride, sodium chloride flush, sodium chloride, ondansetron **OR** ondansetron, acetaminophen **OR** acetaminophen, sodium chloride flush, sodium chloride     Physical Exam:  Vitals:    02/19/24 0827   BP: 98/68   Pulse: 88   Resp: 20   Temp: 98.2 °F (36.8 °C)   SpO2: 95%        General: AAO, NAD  Chest: Nontender  Cardiac: First and Second Heart Sounds are Normal, No Murmurs or Gallops noted  Lungs:Clear to auscultation and percussion.  Abdomen: Soft, NT, ND, +BS  Extremities: No clubbing, no edema  Vascular:  Equal 2+ peripheral pulses.    Lab 
to GI bleed   Surrogate Decision Maker/ POA       Personally reviewed Lab Studies and Imaging     Subjective:     Chief Complaint: SOB    Andrea Bullock is a 73 y.o. male who is seen and examined at bedside. He denies any discomfort, SOB or pain. E is endorsing desire for discharge. Last BM this Am, he is unsure if I was black or had blood, per nursing still with blood streaks.      Review of Systems:      Pertinent positives and negatives discussed in HPI    Objective:     Intake/Output Summary (Last 24 hours) at 2/17/2024 0732  Last data filed at 2/17/2024 0505  Gross per 24 hour   Intake 397.27 ml   Output 300 ml   Net 97.27 ml      Vitals:   Vitals:    02/16/24 1757 02/16/24 2013 02/16/24 2342 02/17/24 0405   BP:  95/63 (!) 87/65    Pulse: 81 79 79    Resp:  20 19    Temp:  98.2 °F (36.8 °C) 98.9 °F (37.2 °C) 97.7 °F (36.5 °C)   TempSrc:  Oral Oral Oral   SpO2:  97% 93%    Weight:  67.4 kg (148 lb 9.4 oz)     Height:  1.676 m (5' 6\")           Physical Exam:    General: NAD  Eyes: EOMI  ENT: neck supple  Cardiovascular: Regular rate.  Respiratory: Clear to auscultation  Gastrointestinal: Soft, non tender  Genitourinary: no suprapubic tenderness  Musculoskeletal: No edema  Skin: warm, dry  Neuro: Alert.  Psych: Mood appropriate.     Medications:   Medications:    sodium chloride flush  5-40 mL IntraVENous 2 times per day    sodium chloride flush  5-40 mL IntraVENous 2 times per day    pantoprazole (PROTONIX) 40 mg in sodium chloride (PF) 0.9 % 10 mL injection  40 mg IntraVENous Q12H    sodium chloride flush  5-40 mL IntraVENous 2 times per day    thiamine  100 mg Oral Daily    multivitamin  1 tablet Oral Daily    [Held by provider] amLODIPine  10 mg Oral Daily    [Held by provider] aspirin  81 mg Oral Daily    atorvastatin  20 mg Oral Daily    PHENobarbital  64.8 mg Oral BID    Followed by    [START ON 2/18/2024] PHENobarbital  32.4 mg Oral BID    Followed by    [START ON 2/19/2024] PHENobarbital  32.4 mg Oral 
Labs     02/19/24  0308 02/19/24  1253 02/19/24  1904   HGB 8.5* 8.5* 8.6*   HCT 30.2* 27.1* 28.3*     BMP:   Recent Labs     02/18/24  0228 02/19/24  0308    133*   K 3.6 4.4    100   CO2 24 20*   PHOS 2.7 2.5   BUN 8 5*   CREATININE 0.5* 0.4*     LIVER PROFILE:   Recent Labs     02/18/24 0228 02/19/24  0308   AST 17 31   ALT 18 17   BILITOT 0.4 0.4   ALKPHOS 115 113     PT/INR: No results for input(s): \"PROTIME\", \"INR\" in the last 72 hours.  APTT: No results for input(s): \"APTT\" in the last 72 hours.  BNP:  No results for input(s): \"BNP\" in the last 72 hours.      Assessment:  Patient Active Problem List    Diagnosis Date Noted    Anemia 02/20/2024    GI bleed 02/16/2024       Electronically signed by NILDA Bae - CNP on 2/20/2024 at 11:50 AM    .Electronically signed by Arvind Bustamante MD on 2/20/24 at 11:55 AM EST

## 2024-02-20 NOTE — DISCHARGE SUMMARY
Discharge Summary    Name:  Andrea Bullock /Age/Sex: 1950  (73 y.o. male)   MRN & CSN:  [id number] & [id number] Admission Date/Time: 2024 12:00 PM   Attending:  Jada Waters MD Discharging Physician: Jada Waters MD       Discharge Diagnosis:          Discharge Exam  Physical Exam      Hospital Course:   Andrea Bullock is a 73 y.o.  male  who presents with GI bleed        The patient expressed appropriate understanding of and agreement with the discharge recommendations, medications, and plan.     Consults this admission:  IP CONSULT TO SOCIAL WORK  IP CONSULT TO GI  IP CONSULT TO CARDIOLOGY  IP CONSULT TO SOCIAL WORK      Discharge Instruction:   Handoff to PCP:     Follow up appointments:   Primary care physician:     Diet:  regular diet   Activity: activity as tolerated  Disposition: Discharged to:   [x]Home, [][facility], []SNF, []Acute Rehab, []Hospice   Condition on discharge: Stable    Discharge Medications:        Medication List        START taking these medications      lisinopril 2.5 MG tablet  Commonly known as: PRINIVIL;ZESTRIL  Take 1 tablet by mouth daily     metoprolol tartrate 25 MG tablet  Commonly known as: LOPRESSOR  Take 1 tablet by mouth 2 times daily     midodrine 5 MG tablet  Commonly known as: PROAMATINE  Take 1 tablet by mouth 3 times daily (with meals)     pantoprazole 20 MG tablet  Commonly known as: Protonix  Take 2 tablets by mouth daily     ranolazine 500 MG extended release tablet  Commonly known as: RANEXA  Take 1 tablet by mouth 2 times daily     therapeutic multivitamin-minerals tablet  Take 1 tablet by mouth daily     thiamine 100 MG tablet  Take 1 tablet by mouth daily     tiotropium 18 MCG inhalation capsule  Commonly known as: Spiriva HandiHaler  Inhale 1 capsule into the lungs daily            CONTINUE taking these medications      aspirin EC 81 MG EC tablet  Take 1 tablet by mouth daily     folic acid 1 MG tablet  Commonly known as: FOLVITE

## 2024-02-20 NOTE — PLAN OF CARE
Problem: Discharge Planning  Goal: Discharge to home or other facility with appropriate resources  2/20/2024 1350 by Skylar White RN  Outcome: Completed  2/20/2024 1350 by Skylar White RN  Outcome: Progressing  2/20/2024 0843 by Skylar White RN  Outcome: Progressing  Flowsheets (Taken 2/20/2024 0715)  Discharge to home or other facility with appropriate resources:   Identify barriers to discharge with patient and caregiver   Arrange for needed discharge resources and transportation as appropriate     Problem: Pain  Goal: Verbalizes/displays adequate comfort level or baseline comfort level  2/20/2024 1350 by Skylar White RN  Outcome: Completed  2/20/2024 1350 by Skylar White RN  Outcome: Progressing  Flowsheets (Taken 2/20/2024 1110)  Verbalizes/displays adequate comfort level or baseline comfort level:   Encourage patient to monitor pain and request assistance   Assess pain using appropriate pain scale   Administer analgesics based on type and severity of pain and evaluate response  2/20/2024 0843 by Skylar White RN  Outcome: Progressing  Flowsheets (Taken 2/20/2024 0710)  Verbalizes/displays adequate comfort level or baseline comfort level:   Encourage patient to monitor pain and request assistance   Assess pain using appropriate pain scale   Administer analgesics based on type and severity of pain and evaluate response     Problem: Safety - Adult  Goal: Free from fall injury  2/20/2024 1350 by Skylar White RN  Outcome: Completed  2/20/2024 1350 by Skylar White RN  Outcome: Progressing  2/20/2024 0843 by Skylar White RN  Outcome: Progressing     Problem: Nutrition Deficit:  Goal: Optimize nutritional status  2/20/2024 1350 by Skylar White RN  Outcome: Completed  2/20/2024 1350 by Skylar White RN  Outcome: Progressing  2/20/2024 0843 by Skylar White RN  Outcome: Progressing

## 2024-02-20 NOTE — PLAN OF CARE
Problem: Discharge Planning  Goal: Discharge to home or other facility with appropriate resources  2/20/2024 1350 by Skylar White RN  Outcome: Progressing  2/20/2024 0843 by Skylar White RN  Outcome: Progressing  Flowsheets (Taken 2/20/2024 0715)  Discharge to home or other facility with appropriate resources:   Identify barriers to discharge with patient and caregiver   Arrange for needed discharge resources and transportation as appropriate     Problem: Pain  Goal: Verbalizes/displays adequate comfort level or baseline comfort level  2/20/2024 1350 by Skylar White RN  Outcome: Progressing  Flowsheets (Taken 2/20/2024 1110)  Verbalizes/displays adequate comfort level or baseline comfort level:   Encourage patient to monitor pain and request assistance   Assess pain using appropriate pain scale   Administer analgesics based on type and severity of pain and evaluate response  2/20/2024 0843 by Skylar White RN  Outcome: Progressing  Flowsheets (Taken 2/20/2024 0710)  Verbalizes/displays adequate comfort level or baseline comfort level:   Encourage patient to monitor pain and request assistance   Assess pain using appropriate pain scale   Administer analgesics based on type and severity of pain and evaluate response     Problem: Safety - Adult  Goal: Free from fall injury  2/20/2024 1350 by Skylar White RN  Outcome: Progressing  2/20/2024 0843 by Skylar White RN  Outcome: Progressing     Problem: Nutrition Deficit:  Goal: Optimize nutritional status  2/20/2024 1350 by Skylar White RN  Outcome: Progressing  2/20/2024 0843 by Skylar White RN  Outcome: Progressing

## 2024-03-20 LAB
APTT: 28.7 SEC (ref 24.5–35.2)
BASOPHILS ABSOLUTE: 0.1 /ΜL
BASOPHILS RELATIVE PERCENT: 0.7 %
BUN BLDV-MCNC: 6 MG/DL
CALCIUM SERPL-MCNC: 9.2 MG/DL
CHLORIDE BLD-SCNC: 102 MMOL/L
CO2: 24 MMOL/L
CREAT SERPL-MCNC: 0.9 MG/DL
EGFR: 91
EOSINOPHILS ABSOLUTE: 0.2 /ΜL
EOSINOPHILS RELATIVE PERCENT: 2.6 %
GLUCOSE BLD-MCNC: 103 MG/DL
HCT VFR BLD CALC: 35.1 % (ref 41–53)
HEMOGLOBIN: 11.4 G/DL (ref 13.5–17.5)
INR BLD: 1.1 (ref 0.9–1.1)
LYMPHOCYTES ABSOLUTE: 1.8 /ΜL
LYMPHOCYTES RELATIVE PERCENT: 25.1 %
MCH RBC QN AUTO: 31.8 PG
MCHC RBC AUTO-ENTMCNC: 32.5 G/DL
MCV RBC AUTO: 97.8 FL
MONOCYTES ABSOLUTE: 0.6 /ΜL
MONOCYTES RELATIVE PERCENT: 9 %
NEUTROPHILS ABSOLUTE: 4.4 /ΜL
NEUTROPHILS RELATIVE PERCENT: 62.3 %
PLATELET # BLD: 157 K/ΜL
PMV BLD AUTO: 12.1 FL
POTASSIUM SERPL-SCNC: 4.3 MMOL/L
PROTHROMBIN TIME: 13.9 SEC (ref 11.7–13.9)
RBC # BLD: 3.59 10^6/ΜL
SODIUM BLD-SCNC: 141 MMOL/L
WBC # BLD: 7 10^3/ML

## 2024-03-21 LAB
BASOPHILS ABSOLUTE: 0.1 K/UL (ref 0–0.3)
BASOPHILS RELATIVE PERCENT: 0.7 % (ref 0–2)
BUN / CREAT RATIO: 7 (ref 7–25)
BUN BLDV-MCNC: 6 MG/DL (ref 3–29)
CALCIUM SERPL-MCNC: 9.2 MG/DL (ref 8.5–10.5)
CHLORIDE BLD-SCNC: 102 MEQ/L (ref 96–110)
CO2: 24 MEQ/L (ref 19–32)
CREAT SERPL-MCNC: 0.9 MG/DL (ref 0.5–1.2)
DIFFERENTIAL COUNT: ABNORMAL
EOSINOPHILS ABSOLUTE: 0.2 K/UL (ref 0–0.5)
EOSINOPHILS RELATIVE PERCENT: 2.6 % (ref 0–5)
ESTIMATED GLOMERULAR FILTRATION RATE CREATININE EQUATION: 91 MLS/MIN/1.73M2
FASTING STATUS: ABNORMAL
GLUCOSE BLD-MCNC: 103 MG/DL (ref 70–99)
HCT VFR BLD CALC: 35.1 % (ref 37.5–51)
HEMOGLOBIN: 11.4 G/DL (ref 13–17.7)
IMMATURE GRANS (ABS): 0 K/UL (ref 0–0.1)
IMMATURE GRANULOCYTES: 0.3 %
LYMPHOCYTES ABSOLUTE: 1.8 K/UL (ref 0.9–4.1)
LYMPHOCYTES RELATIVE PERCENT: 25.1 % (ref 14–51)
MCH RBC QN AUTO: 31.8 PG (ref 26–34)
MCHC RBC AUTO-ENTMCNC: 32.5 G/DL (ref 30.7–35.5)
MCV RBC AUTO: 97.8 FL (ref 80–100)
MONOCYTES ABSOLUTE: 0.6 K/UL (ref 0.2–1)
MONOCYTES RELATIVE PERCENT: 9 % (ref 4–12)
NEUTROPHILS ABSOLUTE: 4.4 K/UL (ref 1.8–7.5)
PDW BLD-RTO: 14 %
PLATELET # BLD: 157 K/UL (ref 140–400)
PMV BLD AUTO: 12.1 FL (ref 7.2–11.7)
POTASSIUM SERPL-SCNC: 4.3 MEQ/L (ref 3.4–5.3)
RBC # BLD: 3.59 M/UL (ref 4.14–5.8)
RETICULOCYTE ABSOLUTE COUNT: 0 /100 WBC
SEGMENTED NEUTROPHILS RELATIVE PERCENT: 62.3 % (ref 42–80)
SODIUM BLD-SCNC: 141 MEQ/L (ref 135–148)
WBC # BLD: 7 K/UL (ref 3.5–10.9)

## 2024-04-05 LAB
ACTIVATED CLOTTING TIME, LOW RANGE: 218 SECONDS (ref 124–164)
ACTIVATED CLOTTING TIME, LOW RANGE: 284 SECONDS (ref 124–164)
ACTIVATED CLOTTING TIME, LOW RANGE: 331 SECONDS (ref 124–164)
ACTIVATED CLOTTING TIME, LOW RANGE: >400 SECONDS (ref 124–164)

## 2024-12-09 ENCOUNTER — TRANSCRIBE ORDERS (OUTPATIENT)
Dept: ADMINISTRATIVE | Age: 74
End: 2024-12-09

## 2024-12-09 DIAGNOSIS — Z12.2 ENCOUNTER FOR SCREENING FOR MALIGNANT NEOPLASM OF RESPIRATORY ORGANS: Primary | ICD-10-CM

## (undated) DEVICE — SNARE VASC L240CM LOOP W10MM SHTH DIA2.4MM RND STIFF CLD

## (undated) DEVICE — FORCEPS BX L240CM JAW DIA2.8MM L CAP W/ NDL MIC MESH TOOTH

## (undated) DEVICE — ENDOSCOPY KIT: Brand: MEDLINE INDUSTRIES, INC.